# Patient Record
Sex: FEMALE | Race: WHITE | NOT HISPANIC OR LATINO
[De-identification: names, ages, dates, MRNs, and addresses within clinical notes are randomized per-mention and may not be internally consistent; named-entity substitution may affect disease eponyms.]

---

## 2018-11-09 ENCOUNTER — RESULT REVIEW (OUTPATIENT)
Age: 28
End: 2018-11-09

## 2019-11-15 ENCOUNTER — RESULT REVIEW (OUTPATIENT)
Age: 29
End: 2019-11-15

## 2020-05-22 ENCOUNTER — TRANSCRIPTION ENCOUNTER (OUTPATIENT)
Age: 30
End: 2020-05-22

## 2020-10-01 ENCOUNTER — APPOINTMENT (OUTPATIENT)
Dept: DERMATOLOGY | Facility: CLINIC | Age: 30
End: 2020-10-01
Payer: COMMERCIAL

## 2020-10-01 PROCEDURE — 99203 OFFICE O/P NEW LOW 30 MIN: CPT

## 2020-10-15 ENCOUNTER — TRANSCRIPTION ENCOUNTER (OUTPATIENT)
Age: 30
End: 2020-10-15

## 2021-02-10 ENCOUNTER — APPOINTMENT (OUTPATIENT)
Dept: DERMATOLOGY | Facility: CLINIC | Age: 31
End: 2021-02-10
Payer: COMMERCIAL

## 2021-02-10 PROCEDURE — 99072 ADDL SUPL MATRL&STAF TM PHE: CPT

## 2021-02-10 PROCEDURE — 99213 OFFICE O/P EST LOW 20 MIN: CPT

## 2021-04-15 ENCOUNTER — RESULT REVIEW (OUTPATIENT)
Age: 31
End: 2021-04-15

## 2021-11-27 ENCOUNTER — TRANSCRIPTION ENCOUNTER (OUTPATIENT)
Age: 31
End: 2021-11-27

## 2021-12-30 ENCOUNTER — APPOINTMENT (OUTPATIENT)
Dept: DERMATOLOGY | Facility: CLINIC | Age: 31
End: 2021-12-30
Payer: COMMERCIAL

## 2021-12-30 PROCEDURE — 99214 OFFICE O/P EST MOD 30 MIN: CPT

## 2022-05-24 ENCOUNTER — RESULT REVIEW (OUTPATIENT)
Age: 32
End: 2022-05-24

## 2022-08-08 ENCOUNTER — EMERGENCY (EMERGENCY)
Facility: HOSPITAL | Age: 32
LOS: 1 days | Discharge: HOME | End: 2022-08-08
Attending: STUDENT IN AN ORGANIZED HEALTH CARE EDUCATION/TRAINING PROGRAM
Payer: COMMERCIAL

## 2022-08-08 VITALS
SYSTOLIC BLOOD PRESSURE: 130 MMHG | HEIGHT: 62 IN | HEART RATE: 100 BPM | RESPIRATION RATE: 18 BRPM | OXYGEN SATURATION: 98 % | DIASTOLIC BLOOD PRESSURE: 72 MMHG | WEIGHT: 156.75 LBS | TEMPERATURE: 99 F

## 2022-08-08 LAB
ALBUMIN SERPL ELPH-MCNC: 4.5 G/DL — SIGNIFICANT CHANGE UP (ref 3.3–5.2)
ALP SERPL-CCNC: 48 U/L — SIGNIFICANT CHANGE UP (ref 40–120)
ALT FLD-CCNC: 18 U/L — SIGNIFICANT CHANGE UP
ANION GAP SERPL CALC-SCNC: 14 MMOL/L — SIGNIFICANT CHANGE UP (ref 5–17)
AST SERPL-CCNC: 21 U/L — SIGNIFICANT CHANGE UP
BASOPHILS # BLD AUTO: 0.05 K/UL — SIGNIFICANT CHANGE UP (ref 0–0.2)
BASOPHILS NFR BLD AUTO: 0.5 % — SIGNIFICANT CHANGE UP (ref 0–2)
BILIRUB SERPL-MCNC: 0.5 MG/DL — SIGNIFICANT CHANGE UP (ref 0.4–2)
BLD GP AB SCN SERPL QL: SIGNIFICANT CHANGE UP
BUN SERPL-MCNC: 14.3 MG/DL — SIGNIFICANT CHANGE UP (ref 8–20)
CALCIUM SERPL-MCNC: 9.2 MG/DL — SIGNIFICANT CHANGE UP (ref 8.4–10.5)
CHLORIDE SERPL-SCNC: 98 MMOL/L — SIGNIFICANT CHANGE UP (ref 98–107)
CO2 SERPL-SCNC: 24 MMOL/L — SIGNIFICANT CHANGE UP (ref 22–29)
CREAT SERPL-MCNC: 0.5 MG/DL — SIGNIFICANT CHANGE UP (ref 0.5–1.3)
EGFR: 127 ML/MIN/1.73M2 — SIGNIFICANT CHANGE UP
EOSINOPHIL # BLD AUTO: 0.04 K/UL — SIGNIFICANT CHANGE UP (ref 0–0.5)
EOSINOPHIL NFR BLD AUTO: 0.4 % — SIGNIFICANT CHANGE UP (ref 0–6)
GLUCOSE SERPL-MCNC: 139 MG/DL — HIGH (ref 70–99)
HCG SERPL-ACNC: <4 MIU/ML — SIGNIFICANT CHANGE UP
HCT VFR BLD CALC: 40.4 % — SIGNIFICANT CHANGE UP (ref 34.5–45)
HGB BLD-MCNC: 13.9 G/DL — SIGNIFICANT CHANGE UP (ref 11.5–15.5)
IMM GRANULOCYTES NFR BLD AUTO: 0.5 % — SIGNIFICANT CHANGE UP (ref 0–1.5)
LYMPHOCYTES # BLD AUTO: 1.94 K/UL — SIGNIFICANT CHANGE UP (ref 1–3.3)
LYMPHOCYTES # BLD AUTO: 17.6 % — SIGNIFICANT CHANGE UP (ref 13–44)
MCHC RBC-ENTMCNC: 30.4 PG — SIGNIFICANT CHANGE UP (ref 27–34)
MCHC RBC-ENTMCNC: 34.4 GM/DL — SIGNIFICANT CHANGE UP (ref 32–36)
MCV RBC AUTO: 88.4 FL — SIGNIFICANT CHANGE UP (ref 80–100)
MONOCYTES # BLD AUTO: 0.54 K/UL — SIGNIFICANT CHANGE UP (ref 0–0.9)
MONOCYTES NFR BLD AUTO: 4.9 % — SIGNIFICANT CHANGE UP (ref 2–14)
NEUTROPHILS # BLD AUTO: 8.43 K/UL — HIGH (ref 1.8–7.4)
NEUTROPHILS NFR BLD AUTO: 76.1 % — SIGNIFICANT CHANGE UP (ref 43–77)
PLATELET # BLD AUTO: 418 K/UL — HIGH (ref 150–400)
POTASSIUM SERPL-MCNC: 3.5 MMOL/L — SIGNIFICANT CHANGE UP (ref 3.5–5.3)
POTASSIUM SERPL-SCNC: 3.5 MMOL/L — SIGNIFICANT CHANGE UP (ref 3.5–5.3)
PROT SERPL-MCNC: 7.2 G/DL — SIGNIFICANT CHANGE UP (ref 6.6–8.7)
RBC # BLD: 4.57 M/UL — SIGNIFICANT CHANGE UP (ref 3.8–5.2)
RBC # FLD: 12.2 % — SIGNIFICANT CHANGE UP (ref 10.3–14.5)
SODIUM SERPL-SCNC: 136 MMOL/L — SIGNIFICANT CHANGE UP (ref 135–145)
WBC # BLD: 11.05 K/UL — HIGH (ref 3.8–10.5)
WBC # FLD AUTO: 11.05 K/UL — HIGH (ref 3.8–10.5)

## 2022-08-08 PROCEDURE — 99285 EMERGENCY DEPT VISIT HI MDM: CPT

## 2022-08-08 NOTE — ED STATDOCS - PATIENT PORTAL LINK FT
You can access the FollowMyHealth Patient Portal offered by [facility] by registering at the following website: [email or web address]. By joining [employer]’s FollowMyHealth portal, you will also be able to view your health information using other applications (apps) compatible with our system.

## 2022-08-08 NOTE — ED STATDOCS - NSFOLLOWUPINSTRUCTIONS_ED_ALL_ED_FT
[facility]                                                                                                                                                                                      Deep Vein Thrombosis       Deep vein thrombosis (DVT) is a condition in which a blood clot forms in a deep vein, such as a vein in the lower leg, thigh, pelvis, or arm. Deep veins are veins in the deep venous system. A clot is blood that has thickened into a gel or solid. This condition is serious and can be life-threatening if the clot travels to the lungs and causes a blockage (pulmonary embolism) in the arteries of the lung. A DVT can also damage veins in the leg. This can lead to long-term, or chronic, venous disease, leg pain, swelling, discoloration, and ulcers or sores (post-thrombotic syndrome).      What are the causes?    This condition may be caused by:  •A slowdown of blood flow.      •Damage to a vein.      •A condition that causes blood to clot more easily, such as certain blood-clotting disorders.        What increases the risk?    The following factors may make you more likely to develop this condition:  •Having obesity.      •Being older, especially older than age 60.    •Being inactive (sedentary lifestyle) or not moving around. This may include:  •Sitting or lying down for longer than 4–6 hours other than to sleep at night.      •Being in the hospital, having major or lengthy surgery, or having a thin, flexible tube (central line catheter) placed in a large vein.        •Being pregnant, giving birth, or having recently given birth.      •Taking medicines that contain estrogen, such as birth control or hormone replacement therapy.      •Using products that contain nicotine or tobacco, especially if you use hormonal birth control.      •Having a history of blood clots or a blood-clotting disease, a blood vessel disease (peripheral vascular disease), or congestive heart disease.      •Having a history of cancer, especially if being treated with chemotherapy.        What are the signs or symptoms?    Symptoms of this condition include:  •Swelling, pain, pressure, or tenderness in an arm or a leg.      •An arm or a leg becoming warm, red, or discolored.      •A leg turning very pale. You may have a large DVT. This is rare.      If the clot is in your leg, you may notice symptoms more or have worse symptoms when you stand or walk.    In some cases, there are no symptoms.      How is this diagnosed?    This condition is diagnosed with:  •Your medical history and a physical exam.    •Tests, such as:  •Blood tests to check how well your blood clots.      •Doppler ultrasound. This is the best way to find a DVT.      •Venogram. Contrast dye is injected into a vein, and X-rays are taken to check for clots.          How is this treated?    Treatment for this condition depends on:  •The cause of your DVT.      •The size and location of your DVT, or having more than one DVT.      •Your risk for bleeding or developing more clots.      •Other medical conditions you may have.      Treatment may include:  •Taking a blood thinner, also called an anticoagulant, to prevent clots from forming and growing.      •Wearing compression stockings, if directed.      •Injecting medicines into the affected vein to break up the clot (catheter-directed thrombolysis). This is used only for severe DVT and only if a specialist recommends it.    •Specific surgical procedures, when DVT is severe or hard to treat. These may be done to:  •Isolate and remove your clot.      •Place an inferior vena cava (IVC) filter in a large vein to catch blood clots before they reach your lungs.        You may get some medical treatments for 6 months or longer.      Follow these instructions at home:    If you are taking blood thinners:     •Talk with your health care provider before you take any medicines that contain aspirin or NSAIDs, such as ibuprofen. These medicines increase your risk for dangerous bleeding.      •Take your medicine exactly as told, at the same time every day. Do not skip a dose. Do not take more than the prescribed dose. This is important.      •Ask your health care provider about foods and medicines that could change the way your blood thinner works (may interact). Avoid these foods and medicines if you are told to do so.    •Avoid anything that may cause bleeding or bruising. You may bleed more easily while taking blood thinners.  •Be very careful when using knives, scissors, or other sharp objects.      •Use an electric razor instead of a blade.      •Avoid activities that could cause injury or bruising, and follow instructions for preventing falls.      •Tell your health care provider if you have had any internal bleeding, bleeding ulcers, or neurologic diseases, such as strokes or cerebral aneurysms.        •Wear a medical alert bracelet or carry a card that lists what medicines you take.      General instructions     •Take over-the-counter and prescription medicines only as told by your health care provider.      •Return to your normal activities as told by your health care provider. Ask your health care provider what activities are safe for you.      •If recommended, wear compression stockings as told by your health care provider. These stockings help to prevent blood clots and reduce swelling in your legs.      •Keep all follow-up visits as told by your health care provider. This is important.        Contact a health care provider if:    •You miss a dose of your blood thinner.      •You have new or worse pain, swelling, or redness in an arm or a leg.      •You have worsening numbness or tingling in an arm or a leg.      •You have unusual bruising.        Get help right away if:  •You have signs or symptoms that a blood clot has moved to the lungs. These may include:  •Shortness of breath.      •Chest pain.      •Fast or irregular heartbeats (palpitations).      •Light-headedness or dizziness.      •Coughing up blood.      •You have signs or symptoms that your blood is too thin. These may include:  •Blood in your vomit, stool, or urine.      •A cut that will not stop bleeding.      •A menstrual period that is heavier than usual.      •A severe headache or confusion.        These symptoms may represent a serious problem that is an emergency. Do not wait to see if the symptoms will go away. Get medical help right away. Call your local emergency services (911 in the [place] Do not drive yourself to the hospital.       Summary    •Deep vein thrombosis (DVT) happens when a blood clot forms in a deep vein. This may occur in the lower leg, thigh, pelvis, or arm.      •Symptoms affect the arm or leg and can include swelling, pain, tenderness, warmth, redness, or discoloration.      •This condition may be treated with medicines or compression stockings. In severe cases, surgery may be done.      •If you are taking blood thinners, take them exactly as told. Do not skip a dose. Do not take more than is prescribed.      •Get help right away if you have shortness of breath, chest pain, fast or irregular heartbeats, or blood in your vomit, urine, or stool.      This information is not intended to replace advice given to you by your health care provider. Make sure you discuss any questions you have with your health care provider.      Document Revised: 12/11/2020 Document Reviewed: 12/12/2020    Elsevier Patient Education © 2022 [employer]

## 2022-08-08 NOTE — ED ADULT NURSE NOTE - OBJECTIVE STATEMENT
Patient is A&Ox3, resp wnl, c/o swelling to right arm, had covid 7/24. Patient states she just came from having an US + Blood clot to right arm x 2

## 2022-08-08 NOTE — ED STATDOCS - OBJECTIVE STATEMENT
30 y/o female with no significant PMHx presents with one day of right arm swelling noticed yesterday when going for walk with . denies trauma. went to urgent care who ordered US of right upper extremity today. found to have a DVT in right upper subclavian and axillary. Had COVID a few weeks ago, fully recovered. Not taking any oral contraception. Denies any clotting disorders in the family. Denies neck swelling, HA, CP, SOB, abdominal pain, leg swelling.

## 2022-08-08 NOTE — ED STATDOCS - ATTENDING APP SHARED VISIT CONTRIBUTION OF CARE
I, Patricia Cheng, performed a face to face bedside interview with this patient regarding history of present illness, review of symptoms and relevant past medical, social and family history.  I completed an independent physical examination. Medical decision making, follow-up on ordered tests (ie labs, radiologic studies) and re-evaluation of the patient's status has been communicated to the ACP.  Disposition of the patient will be based on test outcome and response to ED interventions.

## 2022-08-08 NOTE — ED STATDOCS - CLINICAL SUMMARY MEDICAL DECISION MAKING FREE TEXT BOX
found to DVT in right subclavian and axially on out patient US with report. will get CT to evaluate for clot burden, check labs, and re-assess.

## 2022-08-08 NOTE — ED STATDOCS - NSFOLLOWUPCLINICS_GEN_ALL_ED_FT
[facility]ascular Surgery  Vascular Surgery  [address] [place] [address]  Phone: [phone]  Fax:

## 2022-08-08 NOTE — ED ADULT NURSE NOTE - CHIEF COMPLAINT QUOTE
pt states she just came from having an US + Blood clot to right arm x 2  A&Ox3, resp wnl, c/o swelling to right arm, had covid 7/24

## 2022-08-08 NOTE — ED STATDOCS - PHYSICAL EXAMINATION
Vital Signs per nursing documentation  Gen: well appearing, no acute distress  HEENT: NCAT, MMM  Cardiac: regular rate rhythm, normal S1S2  Chest: clear to auscultation bilateral, no wheezes or crackles  Abdomen: soft, non tender non distended  Extremity: Minimal right arm swelling. no gross deformity, good perfusion  Skin: no rash  Neuro: nonfocal neuro exam, gait steady

## 2022-08-08 NOTE — ED STATDOCS - PROGRESS NOTE DETAILS
reviewed ct rsults, ultrasound with dvt, vascular consulted, waiting for vascular to get back with recommendations, pt stating that she can no longer wait in the ed, explained to patient risks of leaving, vascular still stating has not been able to reach attending  will start on xarelto, contacted mark guerrero to reach out to patient for appt with vascular outpatient

## 2022-08-09 ENCOUNTER — INPATIENT (INPATIENT)
Facility: HOSPITAL | Age: 32
LOS: 0 days | Discharge: HOME | DRG: 272 | End: 2022-08-10
Attending: SURGERY | Admitting: SURGERY
Payer: COMMERCIAL

## 2022-08-09 VITALS
RESPIRATION RATE: 18 BRPM | OXYGEN SATURATION: 98 % | SYSTOLIC BLOOD PRESSURE: 128 MMHG | DIASTOLIC BLOOD PRESSURE: 81 MMHG | TEMPERATURE: 98 F | WEIGHT: 145.06 LBS | HEIGHT: 62 IN | HEART RATE: 87 BPM

## 2022-08-09 VITALS
RESPIRATION RATE: 18 BRPM | TEMPERATURE: 98 F | OXYGEN SATURATION: 100 % | HEART RATE: 84 BPM | DIASTOLIC BLOOD PRESSURE: 90 MMHG | SYSTOLIC BLOOD PRESSURE: 120 MMHG

## 2022-08-09 DIAGNOSIS — I82.409 ACUTE EMBOLISM AND THROMBOSIS OF UNSPECIFIED DEEP VEINS OF UNSPECIFIED LOWER EXTREMITY: ICD-10-CM

## 2022-08-09 LAB
ALBUMIN SERPL ELPH-MCNC: 4.5 G/DL — SIGNIFICANT CHANGE UP (ref 3.3–5.2)
ALP SERPL-CCNC: 44 U/L — SIGNIFICANT CHANGE UP (ref 40–120)
ALT FLD-CCNC: 22 U/L — SIGNIFICANT CHANGE UP
ANION GAP SERPL CALC-SCNC: 13 MMOL/L — SIGNIFICANT CHANGE UP (ref 5–17)
APTT BLD: 36.2 SEC — HIGH (ref 27.5–35.5)
APTT BLD: 58.4 SEC — HIGH (ref 27.5–35.5)
AST SERPL-CCNC: 37 U/L — HIGH
BASOPHILS # BLD AUTO: 0.05 K/UL — SIGNIFICANT CHANGE UP (ref 0–0.2)
BASOPHILS NFR BLD AUTO: 0.4 % — SIGNIFICANT CHANGE UP (ref 0–2)
BILIRUB SERPL-MCNC: 0.8 MG/DL — SIGNIFICANT CHANGE UP (ref 0.4–2)
BUN SERPL-MCNC: 10 MG/DL — SIGNIFICANT CHANGE UP (ref 8–20)
CALCIUM SERPL-MCNC: 9.3 MG/DL — SIGNIFICANT CHANGE UP (ref 8.4–10.5)
CHLORIDE SERPL-SCNC: 101 MMOL/L — SIGNIFICANT CHANGE UP (ref 98–107)
CO2 SERPL-SCNC: 23 MMOL/L — SIGNIFICANT CHANGE UP (ref 22–29)
CREAT SERPL-MCNC: 0.42 MG/DL — LOW (ref 0.5–1.3)
EGFR: 132 ML/MIN/1.73M2 — SIGNIFICANT CHANGE UP
EOSINOPHIL # BLD AUTO: 0.05 K/UL — SIGNIFICANT CHANGE UP (ref 0–0.5)
EOSINOPHIL NFR BLD AUTO: 0.4 % — SIGNIFICANT CHANGE UP (ref 0–6)
GLUCOSE SERPL-MCNC: 98 MG/DL — SIGNIFICANT CHANGE UP (ref 70–99)
HCT VFR BLD CALC: 41.3 % — SIGNIFICANT CHANGE UP (ref 34.5–45)
HCT VFR BLD CALC: 41.7 % — SIGNIFICANT CHANGE UP (ref 34.5–45)
HGB BLD-MCNC: 13.8 G/DL — SIGNIFICANT CHANGE UP (ref 11.5–15.5)
HGB BLD-MCNC: 14.1 G/DL — SIGNIFICANT CHANGE UP (ref 11.5–15.5)
IMM GRANULOCYTES NFR BLD AUTO: 0.3 % — SIGNIFICANT CHANGE UP (ref 0–1.5)
INR BLD: 1.55 RATIO — HIGH (ref 0.88–1.16)
LYMPHOCYTES # BLD AUTO: 15.4 % — SIGNIFICANT CHANGE UP (ref 13–44)
LYMPHOCYTES # BLD AUTO: 2.19 K/UL — SIGNIFICANT CHANGE UP (ref 1–3.3)
MCHC RBC-ENTMCNC: 29.7 PG — SIGNIFICANT CHANGE UP (ref 27–34)
MCHC RBC-ENTMCNC: 30.1 PG — SIGNIFICANT CHANGE UP (ref 27–34)
MCHC RBC-ENTMCNC: 33.4 GM/DL — SIGNIFICANT CHANGE UP (ref 32–36)
MCHC RBC-ENTMCNC: 33.8 GM/DL — SIGNIFICANT CHANGE UP (ref 32–36)
MCV RBC AUTO: 88.9 FL — SIGNIFICANT CHANGE UP (ref 80–100)
MCV RBC AUTO: 89 FL — SIGNIFICANT CHANGE UP (ref 80–100)
MONOCYTES # BLD AUTO: 0.97 K/UL — HIGH (ref 0–0.9)
MONOCYTES NFR BLD AUTO: 6.8 % — SIGNIFICANT CHANGE UP (ref 2–14)
NEUTROPHILS # BLD AUTO: 10.95 K/UL — HIGH (ref 1.8–7.4)
NEUTROPHILS NFR BLD AUTO: 76.7 % — SIGNIFICANT CHANGE UP (ref 43–77)
PLATELET # BLD AUTO: 385 K/UL — SIGNIFICANT CHANGE UP (ref 150–400)
PLATELET # BLD AUTO: 420 K/UL — HIGH (ref 150–400)
POTASSIUM SERPL-MCNC: 5 MMOL/L — SIGNIFICANT CHANGE UP (ref 3.5–5.3)
POTASSIUM SERPL-SCNC: 5 MMOL/L — SIGNIFICANT CHANGE UP (ref 3.5–5.3)
PROT SERPL-MCNC: 7.7 G/DL — SIGNIFICANT CHANGE UP (ref 6.6–8.7)
PROTHROM AB SERPL-ACNC: 18.1 SEC — HIGH (ref 10.5–13.4)
RAPID RVP RESULT: SIGNIFICANT CHANGE UP
RBC # BLD: 4.64 M/UL — SIGNIFICANT CHANGE UP (ref 3.8–5.2)
RBC # BLD: 4.69 M/UL — SIGNIFICANT CHANGE UP (ref 3.8–5.2)
RBC # FLD: 12.1 % — SIGNIFICANT CHANGE UP (ref 10.3–14.5)
RBC # FLD: 12.2 % — SIGNIFICANT CHANGE UP (ref 10.3–14.5)
SARS-COV-2 RNA SPEC QL NAA+PROBE: SIGNIFICANT CHANGE UP
SODIUM SERPL-SCNC: 137 MMOL/L — SIGNIFICANT CHANGE UP (ref 135–145)
WBC # BLD: 10.81 K/UL — HIGH (ref 3.8–10.5)
WBC # BLD: 14.25 K/UL — HIGH (ref 3.8–10.5)
WBC # FLD AUTO: 10.81 K/UL — HIGH (ref 3.8–10.5)
WBC # FLD AUTO: 14.25 K/UL — HIGH (ref 3.8–10.5)

## 2022-08-09 PROCEDURE — 84702 CHORIONIC GONADOTROPIN TEST: CPT

## 2022-08-09 PROCEDURE — 86901 BLOOD TYPING SEROLOGIC RH(D): CPT

## 2022-08-09 PROCEDURE — 99284 EMERGENCY DEPT VISIT MOD MDM: CPT | Mod: 25

## 2022-08-09 PROCEDURE — 86900 BLOOD TYPING SEROLOGIC ABO: CPT

## 2022-08-09 PROCEDURE — 80053 COMPREHEN METABOLIC PANEL: CPT

## 2022-08-09 PROCEDURE — 86850 RBC ANTIBODY SCREEN: CPT

## 2022-08-09 PROCEDURE — 71275 CT ANGIOGRAPHY CHEST: CPT | Mod: MD

## 2022-08-09 PROCEDURE — 99285 EMERGENCY DEPT VISIT HI MDM: CPT

## 2022-08-09 PROCEDURE — 71275 CT ANGIOGRAPHY CHEST: CPT | Mod: 26,MD

## 2022-08-09 PROCEDURE — 85025 COMPLETE CBC W/AUTO DIFF WBC: CPT

## 2022-08-09 PROCEDURE — 71046 X-RAY EXAM CHEST 2 VIEWS: CPT | Mod: 26

## 2022-08-09 PROCEDURE — 36415 COLL VENOUS BLD VENIPUNCTURE: CPT

## 2022-08-09 PROCEDURE — 93971 EXTREMITY STUDY: CPT | Mod: 26,RT

## 2022-08-09 RX ORDER — HEPARIN SODIUM 5000 [USP'U]/ML
INJECTION INTRAVENOUS; SUBCUTANEOUS
Qty: 25000 | Refills: 0 | Status: DISCONTINUED | OUTPATIENT
Start: 2022-08-09 | End: 2022-08-10

## 2022-08-09 RX ORDER — HEPARIN SODIUM 5000 [USP'U]/ML
2500 INJECTION INTRAVENOUS; SUBCUTANEOUS EVERY 6 HOURS
Refills: 0 | Status: DISCONTINUED | OUTPATIENT
Start: 2022-08-09 | End: 2022-08-10

## 2022-08-09 RX ORDER — HEPARIN SODIUM 5000 [USP'U]/ML
5500 INJECTION INTRAVENOUS; SUBCUTANEOUS EVERY 6 HOURS
Refills: 0 | Status: DISCONTINUED | OUTPATIENT
Start: 2022-08-09 | End: 2022-08-10

## 2022-08-09 RX ORDER — ONDANSETRON 8 MG/1
4 TABLET, FILM COATED ORAL EVERY 6 HOURS
Refills: 0 | Status: DISCONTINUED | OUTPATIENT
Start: 2022-08-09 | End: 2022-08-10

## 2022-08-09 RX ORDER — FONDAPARINUX SODIUM 2.5 MG/.5ML
1 INJECTION, SOLUTION SUBCUTANEOUS
Qty: 42 | Refills: 0
Start: 2022-08-09 | End: 2022-08-29

## 2022-08-09 RX ORDER — HEPARIN SODIUM 5000 [USP'U]/ML
5500 INJECTION INTRAVENOUS; SUBCUTANEOUS ONCE
Refills: 0 | Status: COMPLETED | OUTPATIENT
Start: 2022-08-09 | End: 2022-08-09

## 2022-08-09 RX ORDER — ACETAMINOPHEN 500 MG
650 TABLET ORAL EVERY 6 HOURS
Refills: 0 | Status: DISCONTINUED | OUTPATIENT
Start: 2022-08-09 | End: 2022-08-10

## 2022-08-09 RX ORDER — RIVAROXABAN 15 MG-20MG
15 KIT ORAL ONCE
Refills: 0 | Status: COMPLETED | OUTPATIENT
Start: 2022-08-09 | End: 2022-08-09

## 2022-08-09 RX ORDER — SODIUM CHLORIDE 9 MG/ML
1000 INJECTION INTRAMUSCULAR; INTRAVENOUS; SUBCUTANEOUS
Refills: 0 | Status: DISCONTINUED | OUTPATIENT
Start: 2022-08-09 | End: 2022-08-10

## 2022-08-09 RX ADMIN — HEPARIN SODIUM 1200 UNIT(S)/HR: 5000 INJECTION INTRAVENOUS; SUBCUTANEOUS at 18:11

## 2022-08-09 RX ADMIN — RIVAROXABAN 15 MILLIGRAM(S): KIT at 05:04

## 2022-08-09 RX ADMIN — HEPARIN SODIUM 1200 UNIT(S)/HR: 5000 INJECTION INTRAVENOUS; SUBCUTANEOUS at 09:42

## 2022-08-09 NOTE — CONSULT NOTE ADULT - SUBJECTIVE AND OBJECTIVE BOX
Patient is a 31yoF with no PMH who presents to the ED with 2 days of RUQ swelling. She mentions she was walking yesterday morning when her arm felt swollen, without pain, numbness/tingling or motor deficits. She presented to an Urgent care center where she underwent RUQ Duplex which showed R subclavian and R axillary vein thrombosis. She mentions she recently recovered from a severe bout of COVID19 infection (tested positive 2 weeks ago). She denies any headache, flushing, chest pain, SOB, fevers, chills, nausea, vomiting, abdominal pain, constipation, diarrhea.     PMH: denies  PSH: R arm surgery (broken arm as a child)  Meds: denies  Allergies: NKDA  SH: denies illicit drug use x3    Vitals:  Vital Signs Last 24 Hrs  T(C): 36.7 (09 Aug 2022 00:09), Max: 37 (08 Aug 2022 18:27)  T(F): 98.1 (09 Aug 2022 00:09), Max: 98.6 (08 Aug 2022 18:27)  HR: 84 (09 Aug 2022 00:09) (84 - 100)  BP: 120/90 (09 Aug 2022 00:09) (120/90 - 130/72)  BP(mean): --  RR: 18 (09 Aug 2022 00:09) (18 - 18)  SpO2: 100% (09 Aug 2022 00:09) (98% - 100%)    Parameters below as of 09 Aug 2022 00:09  Patient On (Oxygen Delivery Method): room air    Labs:  08-08    136  |  98  |  14.3  ----------------------------<  139<H>  3.5   |  24.0  |  0.50    Ca    9.2      08 Aug 2022 19:30    TPro  7.2  /  Alb  4.5  /  TBili  0.5  /  DBili  x   /  AST  21  /  ALT  18  /  AlkPhos  48  08-08                            13.9   11.05 )-----------( 418      ( 08 Aug 2022 19:30 )             40.4       Physical Exam:  Gen: pt lying in bed, alert, in NAD  Resp: unlabored  CVS: RRR  Abd: soft, NT, ND  Ext: moving all extremities spontaneously, sensation intact, pulses 2+  Patient is a 31yoF with no PMH who presents to the ED with 2 days of RUQ swelling. She mentions she was walking yesterday morning when her arm felt swollen, without pain, numbness/tingling or motor deficits. She presented to an Urgent care center where she underwent RUQ Duplex which showed R subclavian and R axillary vein thrombosis. She mentions she recently recovered from a severe bout of COVID19 infection (tested positive 2 weeks ago). She denies any headache, flushing, chest pain, SOB, fevers, chills, nausea, vomiting, abdominal pain, constipation, diarrhea.     PMH: denies  PSH: R arm surgery (broken arm as a child)  Meds: denies  Allergies: NKDA  SH: denies illicit drug use x3  FH: no hyper-/hypocoagulable state    Vitals:  Vital Signs Last 24 Hrs  T(C): 36.7 (09 Aug 2022 00:09), Max: 37 (08 Aug 2022 18:27)  T(F): 98.1 (09 Aug 2022 00:09), Max: 98.6 (08 Aug 2022 18:27)  HR: 84 (09 Aug 2022 00:09) (84 - 100)  BP: 120/90 (09 Aug 2022 00:09) (120/90 - 130/72)  BP(mean): --  RR: 18 (09 Aug 2022 00:09) (18 - 18)  SpO2: 100% (09 Aug 2022 00:09) (98% - 100%)    Parameters below as of 09 Aug 2022 00:09  Patient On (Oxygen Delivery Method): room air    Labs:  08-08    136  |  98  |  14.3  ----------------------------<  139<H>  3.5   |  24.0  |  0.50    Ca    9.2      08 Aug 2022 19:30    TPro  7.2  /  Alb  4.5  /  TBili  0.5  /  DBili  x   /  AST  21  /  ALT  18  /  AlkPhos  48  08-08                            13.9   11.05 )-----------( 418      ( 08 Aug 2022 19:30 )             40.4       Physical Exam:  Gen: pt lying in bed, alert, in NAD  Resp: unlabored  CVS: RRR  Abd: soft, NT, ND  Ext: moving all extremities spontaneously, sensation intact, pulses 2+  Patient is a 31yoF with no PMH who presents to the ED with 2 days of RUQ swelling. She mentions she was walking yesterday morning when her arm felt swollen, without pain, numbness/tingling or motor deficits. She presented to an Urgent care center where she underwent RUQ Duplex which showed R subclavian and R axillary vein thrombosis. She mentions she recently recovered from a severe bout of COVID19 infection (tested positive 2 weeks ago). She denies any headache, flushing, chest pain, SOB, fevers, chills, nausea, vomiting, abdominal pain, constipation, diarrhea.     PMH: denies  PSH: R arm surgery (broken arm as a child)  Meds: denies  Allergies: NKDA  SH: denies illicit drug use x3  FH: no hyper-/hypocoagulable disease    Vitals:  Vital Signs Last 24 Hrs  T(C): 36.7 (09 Aug 2022 00:09), Max: 37 (08 Aug 2022 18:27)  T(F): 98.1 (09 Aug 2022 00:09), Max: 98.6 (08 Aug 2022 18:27)  HR: 84 (09 Aug 2022 00:09) (84 - 100)  BP: 120/90 (09 Aug 2022 00:09) (120/90 - 130/72)  BP(mean): --  RR: 18 (09 Aug 2022 00:09) (18 - 18)  SpO2: 100% (09 Aug 2022 00:09) (98% - 100%)    Parameters below as of 09 Aug 2022 00:09  Patient On (Oxygen Delivery Method): room air    Labs:  08-08    136  |  98  |  14.3  ----------------------------<  139<H>  3.5   |  24.0  |  0.50    Ca    9.2      08 Aug 2022 19:30    TPro  7.2  /  Alb  4.5  /  TBili  0.5  /  DBili  x   /  AST  21  /  ALT  18  /  AlkPhos  48  08-08                            13.9   11.05 )-----------( 418      ( 08 Aug 2022 19:30 )             40.4       Physical Exam:  Gen: pt lying in bed, alert, in NAD  Resp: unlabored  CVS: RRR  Abd: soft, NT, ND  Ext: moving all extremities spontaneously, sensation intact, pulses 2+

## 2022-08-09 NOTE — H&P ADULT - NSHPREVIEWOFSYSTEMS_GEN_ALL_CORE
Patient appears comfortable. No acute distress  HEENT:  Atraumatic. Normocephalic.  Normal oral mucosa, PERRL, Neck is supple  Neurologic: A & O x 3, no focal deficits. EOMI.  Cardiovascular: normal rate and rhythm  Respiratory: respirations are even and non labored   Gastrointestinal:  Soft, Non-tender, non distended  Extremities: No edema, BRAVO, warm and well perfused  Pulses: palpable radial pulses, palpable femoral pulses, palpable pedal pulses

## 2022-08-09 NOTE — ED POST DISCHARGE NOTE - DETAILS
called patient left voicemail on phone, pt should be instructed to return to the ed pt will return to the ed

## 2022-08-09 NOTE — PATIENT PROFILE ADULT - BILL PAYMENT
Pt has been sleeping in sensory room B, Pleasant and cooperative ate breakfast and went back to sleep. Pt is waiting to be reassess today to determine if they meet the criteria to go back home after CO SI attempt. Pt was med comliant and took tylenol for headache.   no

## 2022-08-09 NOTE — PATIENT PROFILE ADULT - FALL HARM RISK - FACTORS NURSING JUDGEMENT
ams earlier, resolved now, may have missed 1 or more doses of Keppra,  BEFAST NEG, aLSO C/O h/a, RESOLVED No

## 2022-08-09 NOTE — ED POST DISCHARGE NOTE - RESULT SUMMARY
vascular attending got back to ER pa at 5:20 RECOMMENDING repeat ultrasound duplex of upper extremeity, starting patient on heparin drip, pending dispo admission pending results of repeat Ultrasound enedelia

## 2022-08-09 NOTE — ED ADULT NURSE NOTE - BREATHING, MLM
Vomiting, abdominal distension, epigastric pain. Onset Friday. But has had years of issues with episodes of vomiting.   
Spontaneous, unlabored and symmetrical

## 2022-08-09 NOTE — H&P ADULT - ASSESSMENT
31 year old female, no significant PMHx, presents to the ED with c/o left arm swelling on Sunday. Pt reports that she was diagnosed with COVID two weeks prior. Patient was seen at urgent care and had an US completed showing a thrombus in the left subclavian vein. Patient sent to the ED from urgent care. Patient had venous duplex showing thrombus of the prox, mid and distal subclavian vein. Patient states that swelling has improved since Sunday. She denies any numbness/tingling/discoloration/excessive exercise, CP, SOB, AP, fevers or chills.       Plan:  Left Subclavian Thrombus   -Continue heparin drip  -CXR completed: no cervical rib noted   -Plan for INARI thrombectomy tomorrow (8/10)  -NPO after midnight        31 year old female, no significant PMHx, presents to the ED with c/o left arm swelling on Sunday. Pt reports that she was diagnosed with COVID two weeks prior. Patient was seen at urgent care and had an US completed showing a thrombus in the left subclavian vein. Patient sent to the ED from urgent care. Patient had venous duplex showing thrombus of the prox, mid and distal subclavian vein. Patient states that swelling has improved since Sunday. She denies any numbness/tingling/discoloration/excessive exercise, CP, SOB, AP, fevers or chills.       Plan:  Left Subclavian Thrombus   -Continue heparin drip  -CXR completed: no cervical rib noted   -Plan for INARI thrombectomy tomorrow (8/10) r/o TOS  -NPO after midnight

## 2022-08-09 NOTE — PATIENT PROFILE ADULT - FALL HARM RISK - UNIVERSAL INTERVENTIONS
Bed in lowest position, wheels locked, appropriate side rails in place/Call bell, personal items and telephone in reach/Instruct patient to call for assistance before getting out of bed or chair/Non-slip footwear when patient is out of bed/[place] to call system/Physically safe environment - no spills, clutter or unnecessary equipment/Purposeful Proactive Rounding/Room/bathroom lighting operational, light cord in reach

## 2022-08-09 NOTE — ED PROVIDER NOTE - PHYSICAL EXAMINATION
Const: Awake, alert and oriented. In no acute distress. Well appearing.  HEENT: NC/AT. Moist mucous membranes.  Eyes: No scleral icterus. EOMI.  Neck:. Soft and supple. Full ROM without pain.  Cardiac: . +S1/S2. No murmurs. Peripheral pulses 2+ and symmetric. No LE edema.  Resp: Speaking in full sentences. No evidence of respiratory distress. No wheezes, rales or rhonchi.  Abd: Soft, non-tender, non-distended. Normal bowel sounds in all 4 quadrants. No guarding or rebound.  MSK: FROM in all extremities neurovasculary intact radial pulse 2_ hand  5/5   Back: Spine midline and non-tender. No CVAT.  Skin: minimal right upper arm swelling noted no signs of infection  Lymph: No cervical lymphadenopathy.  Neuro: Awake, alert & oriented x 3. Moves all extremities symmetrically.

## 2022-08-09 NOTE — PATIENT PROFILE ADULT - FALL HARM RISK - FALL HARM RISK
Patient stated that she is not feeling well. States that she is sweaty and wet. Requesting a gown to put on. No indicators present

## 2022-08-09 NOTE — ED ADULT NURSE REASSESSMENT NOTE - NS ED NURSE REASSESS COMMENT FT1
assumed care of pt from night RN; per RN report pt returns to ED upon vascular team request ; labs sent, US upper extremity done. pending pt PTT results prior to heparin infusion.   pt on tele/o2 monitors, a&ox3 offers no complaints, will continue to monitor

## 2022-08-09 NOTE — ED PROVIDER NOTE - OBJECTIVE STATEMENT
pt is a 30 y/o female presenting to the ed for evaluation pt was seen at [facility] earlier tonight, wanted to go home even though vascular team did not gt back with recommendations. after leaving vascular recommended heparin drip and repeat ultrasound upper extremity, called patient to come back. pt found to have clot in subclavian outpatient, ct angio preformed at [facility] negative   pt with swelling in the right arm x one day  pt denies cp sob fever nausea vomiting abd pain back pain numbness or loss of sensation abrasions or lacerations

## 2022-08-09 NOTE — H&P ADULT - NSHPLABSRESULTS_GEN_ALL_CORE
< from: CT Angio Chest PE Protocol w/ IV Cont (08.09.22 @ 00:58) >    IMPRESSION:    No pulmonary embolism identified.    --- End of Report ---      MARYANN CHAUHAN MD; Attending Radiologist  This document has been electronically signed. Aug  9 2022  1:38AM    < end of copied text >    < from: US Duplex Venous Upper Ext Ltd, Right (08.09.22 @ 07:13) >    IMPRESSION:  Occlusive thrombus throughout the right subclavian vein.    --- End of Report ---      EVANGELINA MATHIS MD; Attending Radiologist  This document has been electronically signed. Aug  9 2022  8:25AM    < end of copied text >

## 2022-08-09 NOTE — H&P ADULT - HISTORY OF PRESENT ILLNESS
31 year old female, no significant PMHx, presents to the ED with c/o left arm swelling on Sunday. Pt reports that she was diagnosed with COVID two weeks prior. Patient was seen at urgent care and had an US completed showing a thrombus in the left subclavian vein. Patient sent to the ED from urgent care. Patient had venous duplex showing thrombus of the prox, mid and distal subclavian vein. Patient states that swelling has improved since Sunday. She denies any numbness/tingling/discoloration/excessive exercise, CP, SOB, AP, fevers or chills.

## 2022-08-09 NOTE — ED PROVIDER NOTE - ATTENDING APP SHARED VISIT CONTRIBUTION OF CARE
Pt with R sublcavian DVT. Pt had initial workup last night and went home pending vascular consult but was called back for admission. no cp. no sob.    physical - rrr. ctab. abd - soft, nt. no rash. RUE with edema nvi distally.    plan - labs and imaging reviewed. vascular surgery to admit for further management.

## 2022-08-09 NOTE — ED ADULT NURSE NOTE - OBJECTIVE STATEMENT
a&ox4 returns to ED s/p being d/c early this AM after rcving call back from ED to "return for vascular consult". pt + DVT RUE; seen in ED last night/early this AM and given 1 dose oral xarelto prior to d/c. pt reports 2-3 days RUE swelling/tenderness radiating to right axillary region went to UC yesterday and was + DVT in RUE - sent to ED after.  pt states she recently had covid 2 weeks ago.  + pulse RUE  pt denies cp/sob.. no acute distress.

## 2022-08-09 NOTE — ED ADULT NURSE REASSESSMENT NOTE - NS ED NURSE REASSESS COMMENT FT1
,per vascular PA and ED MD s/w  ED pharmacist and OK to start heparin gtt at current ordered dose w/o bolus. MD aware of pt aPTT.

## 2022-08-09 NOTE — CONSULT NOTE ADULT - ASSESSMENT
Patient is a 31yoF with no PMH who presents to the ED with 2 days of RUQ swelling. She mentions she was walking yesterday morning when her arm felt swollen, without pain, numbness/tingling or motor deficits. She presented to an Urgent care center where she underwent RUQ Duplex which showed R subclavian and R axillary vein thrombosis. She mentions she recently recovered from a severe bout of COVID19 infection (tested positive 2 weeks ago). She denies any headache, flushing, chest pain, SOB, fevers, chills, nausea, vomiting, abdominal pain, constipation, diarrhea.     In the ED, she is afebrile and HD stable, has mild leukocytosis to 11, no neutrophilic shift, remainder of labs unremarkable. She underwent CTA chest which does not show any abnormalities.     Plan:  -plan pending discussion with attending surgeon Patient is a 31yoF with no PMH who presents to the ED with 2 days of RUQ swelling. She mentions she was walking yesterday morning when her arm felt swollen, without pain, numbness/tingling or motor deficits. She presented to an Urgent care center where she underwent RUQ Duplex which showed R subclavian and R axillary vein thrombosis. She mentions she recently recovered from a severe bout of COVID19 infection (tested positive 2 weeks ago). She denies any headache, flushing, chest pain, SOB, fevers, chills, nausea, vomiting, abdominal pain, constipation, diarrhea.     In the ED, she is afebrile and HD stable, has mild leukocytosis to 11, no neutrophilic shift, remainder of labs unremarkable. She underwent CTA chest which does not show any abnormalities.     Plan:  -recommend repeating RUQ Duplex  -would recommend heparin drip while patient awaiting repeat duplex  -continue care per ED  -can call vascular surgery with questions/concerns  -discussed with attending surgeon

## 2022-08-09 NOTE — CONSULT NOTE ADULT - ASSESSMENT
31 year old female, no significant PMHx, presented to the ED with c/o right arm swelling that started Sunday. Patient states that she was diagnosed with COVID 2 weeks before the swelling started. Patient was given 1 dose of Xarelto in the ED. Pt seen & examined at bedside. Swelling has subsided to the right arm. Patient only c/o "slight heaviness" to arm. Pt denies any pain, numbness, tingling, F/C, N/V, or CP/SOB.    Plan:  1. Right subclavian thrombus (mid,prox,dist)  - 2/2 COVID vs thoracic outlet syndrome  - CXR ordered  - Heparin drip   - Possible venogram tomorrow with INARI thrombectomy

## 2022-08-09 NOTE — ED ADULT NURSE REASSESSMENT NOTE - NS ED NURSE REASSESS COMMENT FT1
per vascular PA; hold heparin gtt; pt possibly to be d/c currently pending vascular attending clinical decision.

## 2022-08-09 NOTE — ED ADULT NURSE REASSESSMENT NOTE - NS ED NURSE REASSESS COMMENT FT1
per pharmacist pt rcvd xarelto in ED prior to d/c earlier this AM; heparin gtt contraindicated as per pharmacist. pharmacist will reach out to MD to clarify. as per MD made aware. ED attending pending call back from vascular team and will s/w pharm

## 2022-08-10 ENCOUNTER — TRANSCRIPTION ENCOUNTER (OUTPATIENT)
Age: 32
End: 2022-08-10

## 2022-08-10 ENCOUNTER — APPOINTMENT (OUTPATIENT)
Dept: VASCULAR SURGERY | Facility: HOSPITAL | Age: 32
End: 2022-08-10

## 2022-08-10 VITALS
RESPIRATION RATE: 18 BRPM | SYSTOLIC BLOOD PRESSURE: 116 MMHG | OXYGEN SATURATION: 98 % | DIASTOLIC BLOOD PRESSURE: 79 MMHG | TEMPERATURE: 99 F | HEART RATE: 75 BPM

## 2022-08-10 DIAGNOSIS — G54.0 BRACHIAL PLEXUS DISORDERS: ICD-10-CM

## 2022-08-10 PROBLEM — Z00.00 ENCOUNTER FOR PREVENTIVE HEALTH EXAMINATION: Status: ACTIVE | Noted: 2022-08-10

## 2022-08-10 LAB
ALBUMIN SERPL ELPH-MCNC: 4 G/DL — SIGNIFICANT CHANGE UP (ref 3.3–5.2)
ALP SERPL-CCNC: 45 U/L — SIGNIFICANT CHANGE UP (ref 40–120)
ALT FLD-CCNC: 18 U/L — SIGNIFICANT CHANGE UP
ANION GAP SERPL CALC-SCNC: 13 MMOL/L — SIGNIFICANT CHANGE UP (ref 5–17)
APTT BLD: 72.9 SEC — HIGH (ref 27.5–35.5)
AST SERPL-CCNC: 17 U/L — SIGNIFICANT CHANGE UP
BILIRUB SERPL-MCNC: 0.6 MG/DL — SIGNIFICANT CHANGE UP (ref 0.4–2)
BUN SERPL-MCNC: 11.8 MG/DL — SIGNIFICANT CHANGE UP (ref 8–20)
CALCIUM SERPL-MCNC: 8.8 MG/DL — SIGNIFICANT CHANGE UP (ref 8.4–10.5)
CHLORIDE SERPL-SCNC: 103 MMOL/L — SIGNIFICANT CHANGE UP (ref 98–107)
CO2 SERPL-SCNC: 22 MMOL/L — SIGNIFICANT CHANGE UP (ref 22–29)
CREAT SERPL-MCNC: 0.48 MG/DL — LOW (ref 0.5–1.3)
EGFR: 128 ML/MIN/1.73M2 — SIGNIFICANT CHANGE UP
GLUCOSE SERPL-MCNC: 93 MG/DL — SIGNIFICANT CHANGE UP (ref 70–99)
HCT VFR BLD CALC: 38.9 % — SIGNIFICANT CHANGE UP (ref 34.5–45)
HGB BLD-MCNC: 13.1 G/DL — SIGNIFICANT CHANGE UP (ref 11.5–15.5)
MAGNESIUM SERPL-MCNC: 1.7 MG/DL — SIGNIFICANT CHANGE UP (ref 1.6–2.6)
MCHC RBC-ENTMCNC: 30.2 PG — SIGNIFICANT CHANGE UP (ref 27–34)
MCHC RBC-ENTMCNC: 33.7 GM/DL — SIGNIFICANT CHANGE UP (ref 32–36)
MCV RBC AUTO: 89.6 FL — SIGNIFICANT CHANGE UP (ref 80–100)
PHOSPHATE SERPL-MCNC: 4.1 MG/DL — SIGNIFICANT CHANGE UP (ref 2.4–4.7)
PLATELET # BLD AUTO: 349 K/UL — SIGNIFICANT CHANGE UP (ref 150–400)
POTASSIUM SERPL-MCNC: 3.9 MMOL/L — SIGNIFICANT CHANGE UP (ref 3.5–5.3)
POTASSIUM SERPL-SCNC: 3.9 MMOL/L — SIGNIFICANT CHANGE UP (ref 3.5–5.3)
PROT SERPL-MCNC: 6.4 G/DL — LOW (ref 6.6–8.7)
RBC # BLD: 4.34 M/UL — SIGNIFICANT CHANGE UP (ref 3.8–5.2)
RBC # FLD: 12.2 % — SIGNIFICANT CHANGE UP (ref 10.3–14.5)
SODIUM SERPL-SCNC: 138 MMOL/L — SIGNIFICANT CHANGE UP (ref 135–145)
WBC # BLD: 10.72 K/UL — HIGH (ref 3.8–10.5)
WBC # FLD AUTO: 10.72 K/UL — HIGH (ref 3.8–10.5)

## 2022-08-10 PROCEDURE — 36010 PLACE CATHETER IN VEIN: CPT

## 2022-08-10 PROCEDURE — C1757: CPT

## 2022-08-10 PROCEDURE — 96365 THER/PROPH/DIAG IV INF INIT: CPT

## 2022-08-10 PROCEDURE — 99285 EMERGENCY DEPT VISIT HI MDM: CPT | Mod: 25

## 2022-08-10 PROCEDURE — C1887: CPT

## 2022-08-10 PROCEDURE — 85730 THROMBOPLASTIN TIME PARTIAL: CPT

## 2022-08-10 PROCEDURE — 99152 MOD SED SAME PHYS/QHP 5/>YRS: CPT | Mod: GC

## 2022-08-10 PROCEDURE — 76937 US GUIDE VASCULAR ACCESS: CPT | Mod: 26,GC

## 2022-08-10 PROCEDURE — 83735 ASSAY OF MAGNESIUM: CPT

## 2022-08-10 PROCEDURE — 85025 COMPLETE CBC W/AUTO DIFF WBC: CPT

## 2022-08-10 PROCEDURE — 93971 EXTREMITY STUDY: CPT

## 2022-08-10 PROCEDURE — 78457 VENOUS THROMBOSIS IMAGING: CPT | Mod: 26,GC

## 2022-08-10 PROCEDURE — C1725: CPT

## 2022-08-10 PROCEDURE — 37187 VENOUS MECH THROMBECTOMY: CPT

## 2022-08-10 PROCEDURE — 75820 VEIN X-RAY ARM/LEG: CPT | Mod: 59

## 2022-08-10 PROCEDURE — 85027 COMPLETE CBC AUTOMATED: CPT

## 2022-08-10 PROCEDURE — 36415 COLL VENOUS BLD VENIPUNCTURE: CPT

## 2022-08-10 PROCEDURE — 37248 TRLUML BALO ANGIOP 1ST VEIN: CPT | Mod: GC

## 2022-08-10 PROCEDURE — 0225U NFCT DS DNA&RNA 21 SARSCOV2: CPT

## 2022-08-10 PROCEDURE — C1769: CPT

## 2022-08-10 PROCEDURE — 80053 COMPREHEN METABOLIC PANEL: CPT

## 2022-08-10 PROCEDURE — C1894: CPT

## 2022-08-10 PROCEDURE — 96366 THER/PROPH/DIAG IV INF ADDON: CPT

## 2022-08-10 PROCEDURE — 84100 ASSAY OF PHOSPHORUS: CPT

## 2022-08-10 PROCEDURE — 71046 X-RAY EXAM CHEST 2 VIEWS: CPT

## 2022-08-10 PROCEDURE — 37248 TRLUML BALO ANGIOP 1ST VEIN: CPT

## 2022-08-10 PROCEDURE — 85610 PROTHROMBIN TIME: CPT

## 2022-08-10 PROCEDURE — 37187 VENOUS MECH THROMBECTOMY: CPT | Mod: GC

## 2022-08-10 RX ORDER — TRAMADOL HYDROCHLORIDE 50 MG/1
50 TABLET ORAL ONCE
Refills: 0 | Status: DISCONTINUED | OUTPATIENT
Start: 2022-08-10 | End: 2022-08-10

## 2022-08-10 RX ORDER — ACETAMINOPHEN 500 MG
1000 TABLET ORAL ONCE
Refills: 0 | Status: COMPLETED | OUTPATIENT
Start: 2022-08-10 | End: 2022-08-10

## 2022-08-10 RX ORDER — SODIUM CHLORIDE 9 MG/ML
250 INJECTION INTRAMUSCULAR; INTRAVENOUS; SUBCUTANEOUS ONCE
Refills: 0 | Status: COMPLETED | OUTPATIENT
Start: 2022-08-10 | End: 2022-08-10

## 2022-08-10 RX ORDER — TRAMADOL HYDROCHLORIDE 50 MG/1
1 TABLET ORAL
Qty: 10 | Refills: 0
Start: 2022-08-10

## 2022-08-10 RX ORDER — FONDAPARINUX SODIUM 2.5 MG/.5ML
1 INJECTION, SOLUTION SUBCUTANEOUS
Qty: 42 | Refills: 0
Start: 2022-08-10 | End: 2022-08-30

## 2022-08-10 RX ORDER — RIVAROXABAN 15 MG-20MG
15 KIT ORAL
Refills: 0 | Status: DISCONTINUED | OUTPATIENT
Start: 2022-08-10 | End: 2022-08-10

## 2022-08-10 RX ADMIN — HEPARIN SODIUM 1200 UNIT(S)/HR: 5000 INJECTION INTRAVENOUS; SUBCUTANEOUS at 07:32

## 2022-08-10 RX ADMIN — HEPARIN SODIUM 1200 UNIT(S)/HR: 5000 INJECTION INTRAVENOUS; SUBCUTANEOUS at 00:26

## 2022-08-10 RX ADMIN — TRAMADOL HYDROCHLORIDE 50 MILLIGRAM(S): 50 TABLET ORAL at 16:20

## 2022-08-10 RX ADMIN — HEPARIN SODIUM 1200 UNIT(S)/HR: 5000 INJECTION INTRAVENOUS; SUBCUTANEOUS at 01:42

## 2022-08-10 RX ADMIN — RIVAROXABAN 15 MILLIGRAM(S): KIT at 16:35

## 2022-08-10 RX ADMIN — SODIUM CHLORIDE 60 MILLILITER(S): 9 INJECTION INTRAMUSCULAR; INTRAVENOUS; SUBCUTANEOUS at 01:31

## 2022-08-10 RX ADMIN — SODIUM CHLORIDE 250 MILLILITER(S): 9 INJECTION INTRAMUSCULAR; INTRAVENOUS; SUBCUTANEOUS at 12:54

## 2022-08-10 RX ADMIN — Medication 400 MILLIGRAM(S): at 13:30

## 2022-08-10 NOTE — CONSULT NOTE ADULT - PROBLEM SELECTOR RECOMMENDATION 9
S/p RT subclavian venous thrombectomy.  Venogram suggestive of thoracic outlet syndrome.  Definitive treatment of thoracic outlet syndrome involves surgical resection of first rib.  No need for surgical intervention this admission.    Patient may follow up with Dr. Peoples in CT Surgery office after discharge.  **An appointment has been made for her on Thursday 8/18/22 at 12PM** for outpatient surgical evaluation  CT Surgery office is located on first floor in [facility] building ([address] [place] [address])  [phone]    D/w Dr. Peoples.

## 2022-08-10 NOTE — BRIEF OPERATIVE NOTE - NSICDXBRIEFPROCEDURE_GEN_ALL_CORE_FT
PROCEDURES:  Endovascular mechanical thrombectomy of vein 10-Aug-2022 12:18:14  Vikki Pete  Angioplasty, vein, transluminal balloon 10-Aug-2022 12:18:46  Vikki Pete

## 2022-08-10 NOTE — DISCHARGE NOTE PROVIDER - HOSPITAL COURSE
31 year old female , s/p covid infection, presented to the ED with complaints of right subclavian thrombus.  The finding was noted at an outpatient urgent Center and then reconfirmed in the ED.  The patient was admitted secondary to concerns for possible Thoracic outlet syndrome.  A venogram was performed 8/10 which confirmed thoracic outlet syndrome.  Thrombectomy was performed to relive the obstructed region. The patient is currently stable for discharge to home and is to follow up with thoracic surgery for surgical planning to correct the thoracic outlet disorder.  Patient is to continue therapeutic anticoagulation.

## 2022-08-10 NOTE — BRIEF OPERATIVE NOTE - OPERATION/FINDINGS
Clot extending from R axillary vein to subclavian vein. Inari suction thrombectomy performed x3 with return of chronic and acute clot. Balloon venoplasty performed at the innominate vein and throughout the subclavian. Completion venography with improved flow through the subclavian.

## 2022-08-10 NOTE — DISCHARGE NOTE PROVIDER - NSDCCPTREATMENT_GEN_ALL_CORE_FT
PRINCIPAL PROCEDURE  Procedure: Endovascular mechanical thrombectomy of vein  Findings and Treatment:       SECONDARY PROCEDURE  Procedure: Angioplasty, vein, transluminal balloon  Findings and Treatment:     Procedure: Endovascular mechanical thrombectomy of vein  Findings and Treatment:

## 2022-08-10 NOTE — DISCHARGE NOTE PROVIDER - CARE PROVIDERS DIRECT ADDRESSES
[email or web address]

## 2022-08-10 NOTE — PROGRESS NOTE ADULT - SUBJECTIVE AND OBJECTIVE BOX
Department of Cardiology                                                                  [facility]/[place] [address]                                                            Telephone: [phone]. Fax:[phone]                                                                             Pre- Procedure H + P/Progress Note      HPI:  31 year old female, no significant PMHx, presented to the ED with c/o right arm swelling that started Sunday. Patient states that she was diagnosed with COVID 2 weeks before the swelling started. Patient was given 1 dose of Xarelto in the ED. Pt seen & examined at bedside. Swelling has subsided to the right arm. Patient only c/o "slight heaviness" to arm. Pt denies any pain, numbness, tingling, F/C, N/V, or CP/SOB. No events overnight, pt reports heaviness to right arm has improved. Scheduled for thrombectomy today.          Symptoms:        Angina (Class):        Ischemic Symptoms:     Heart Failure:        Systolic/Diastolic/Combined:        NYHA Class (within 2 weeks):     Assessment of LVEF:       EF:        Assessed by:        Date:     Echo:     Prior Cardiac Interventions:         Stress Test: Date:        Protocol:        Duration of Exercise:        Symptoms:        EKG Changes:        DTS:        Myocardial Imaging:        Risk Assessment (Low, Medium, High):       Risk Assessments:  ASA:  Mallampati:  Bleeding Risk:  Creatinine:  GFR:    Associated Risk Factors:        Frailty Assessment: (none/mild/mod/severe)       Cerebrovascular Disease: N/A       Chronic Lung Disease: N/A       Peripheral Arterial Disease: N/A       Chronic Kidney Disease (if yes, what is GFR): N/A       Uncontrolled Diabetes (if yes, what is HgbA1C or FBS): N/A       Poorly Controlled Hypertension (if yes, what is SBP): N/A       Morbid Obesity (if yes, what is BMI): N/A       History of Recent Ventricular Arrhythmia: N/A       Inability to Ambulate Safely: N/A       Need for Therapeutic Anticoagulation: N/A       Antiplatelet or Contrast Allergy: N/A    Antianginal Therapies:        Beta Blockers:         Calcium Channel Blockers:        Long Acting Nitrates:        Ranexa:     	  MEDICATIONS:        acetaminophen     Tablet .. 650 milliGRAM(s) Oral every 6 hours PRN  ondansetron Injectable 4 milliGRAM(s) IV Push every 6 hours PRN        heparin   Injectable 5500 Unit(s) IV Push every 6 hours PRN  heparin   Injectable 2500 Unit(s) IV Push every 6 hours PRN  heparin  Infusion.  Unit(s)/Hr IV Continuous <Continuous>  sodium chloride 0.9%. 1000 milliLiter(s) IV Continuous <Continuous>        ROS: as stated above, otherwise negative    PHYSICAL EXAM:  Constitutional: A & O x 3  HEENT:   Normal oral mucosa, PERRL, EOMI	  Cardiovascular: Normal S1 S2, No JVD, No murmurs  Respiratory: Lungs clear to auscultation	  Gastrointestinal:  Soft, Non-tender, + BS	  Skin: No rashes, No ecchymoses, No cyanosis  Neurologic: Non-focal  Extremities: Normal range of motion, no edema  Vascular: Peripheral pulses palpable + bilaterally      T(C): 37 (08-10-22 @ 07:34), Max: 37.1 (08-09-22 @ 11:01)  HR: 97 (08-10-22 @ 09:17) (70 - 97)  BP: 123/80 (08-10-22 @ 09:17) (101/67 - 140/95)  RR: 16 (08-10-22 @ 09:17) (16 - 20)  SpO2: 98% (08-10-22 @ 09:17) (97% - 99%)  Wt(kg): --      I&O's Summary      Daily     Daily     TELEMETRY: 	      ECG:  	    LABS:	 	                                    13.1   10.72 )-----------( 349      ( 10 Aug 2022 05:34 )             38.9     08-10    138  |  103  |  11.8  ----------------------------<  93  3.9   |  22.0  |  0.48<L>    Ca    8.8      10 Aug 2022 05:34  Phos  4.1     08-10  Mg     1.7     08-10    TPro  6.4<L>  /  Alb  4.0  /  TBili  0.6  /  DBili  x   /  AST  17  /  ALT  18  /  AlkPhos  45  08-10      Tnl:    Lipid Profile:   TC  TG  LDL  HDL    HgA1c:     proBNP:       Impression:      Plan:  -plan for LHC via RA vs FA  -patient seen and examined  -confirmed appropriate NPO duration  -ECG and Labs reviewed  -Aspirin 81mg po pre-cath  -NS 250mL bolus pre-cath******  -procedure discussed with patient; risks and benefits explained, questions answered  -consent obtained by attending IC                                                                               Department of Cardiology                                                                  [facility]/[place] [address] [address] [place] [address]                                                            Telephone: [phone]. Fax:[phone]                                                                             Pre- Procedure H + P/Progress Note      HPI:  31 year old female, no significant PMHx, presented to the ED with c/o right arm swelling that started Sunday. Patient states that she was diagnosed with COVID 2 weeks before the swelling started, IUD removed ~1month ago. Patient was given 1 dose of Xarelto in the ED. Pt seen & examined at bedside. Swelling has subsided to the right arm. Patient only c/o "slight heaviness" to arm. Pt denies any pain, numbness, tingling, F/C, N/V, or CP/SOB. No events overnight, pt reports heaviness to right arm has improved. Ultrasound with occlusive thrombus throughout subclavian vein, CTA neg for PE, presents for venogram/Inari thrombectomy by dr Lui.    Venogram with clot extending from R axillary vein to subclavian vein. Inari suction thrombectomy performed x3 with return of chronic and acute clot. Balloon venoplasty performed at the innominate vein and throughout the subclavian. Completion venography with improved flow through the subclavian. + thoracic outlet syndrome and Thoracic surgery to consult.       RUE venous duplex 8/9/22:  IMPRESSION:  Occlusive thrombus throughout the right subclavian vein.    CTA PE Protocol:  IMPRESSION:  No pulmonary embolism identified.        ASA: 2  Mallampati: 2  Bleeding Risk: 0.9%  Creatinine: 0.48  GFR: 128    	  MEDICATIONS:  acetaminophen     Tablet .. 650 milliGRAM(s) Oral every 6 hours PRN  ondansetron Injectable 4 milliGRAM(s) IV Push every 6 hours PRN  heparin   Injectable 5500 Unit(s) IV Push every 6 hours PRN  heparin   Injectable 2500 Unit(s) IV Push every 6 hours PRN  heparin  Infusion.  Unit(s)/Hr IV Continuous <Continuous>  sodium chloride 0.9%. 1000 milliLiter(s) IV Continuous <Continuous>        ROS: as stated above, otherwise negative    PHYSICAL EXAM:  Constitutional: A & O x 3  HEENT:   Normal oral mucosa, PERRL, EOMI	  Cardiovascular: Normal S1 S2, No JVD, No murmurs  Respiratory: Lungs clear to auscultation	  Gastrointestinal:  Soft, Non-tender, + BS	  Skin: No rashes, No ecchymoses, No cyanosis  Neurologic: Non-focal  Extremities: Normal range of motion, no edema  Vascular: Peripheral pulses palpable + bilaterally      T(C): 37 (08-10-22 @ 07:34), Max: 37.1 (08-09-22 @ 11:01)  HR: 97 (08-10-22 @ 09:17) (70 - 97)  BP: 123/80 (08-10-22 @ 09:17) (101/67 - 140/95)  RR: 16 (08-10-22 @ 09:17) (16 - 20)  SpO2: 98% (08-10-22 @ 09:17) (97% - 99%)    LABS:	                           13.1   10.72 )-----------( 349      ( 10 Aug 2022 05:34 )             38.9     08-10    138  |  103  |  11.8  ----------------------------<  93  3.9   |  22.0  |  0.48<L>    Ca    8.8      10 Aug 2022 05:34  Phos  4.1     08-10  Mg     1.7     08-10    TPro  6.4<L>  /  Alb  4.0  /  TBili  0.6  /  DBili  x   /  AST  17  /  ALT  18  /  AlkPhos  45  08-10          Impression:  30yo female presenting with occlusive thrombus right subclavian vein, now s/p venogram with clot extending from R axillary vein to subclavian vein. Inari suction thrombectomy performed x3 with return of chronic and acute clot. Balloon venoplasty performed at the innominate vein and throughout the subclavian. Completion venography with improved flow through the subclavian. + thoracic outlet syndrome and Thoracic surgery to consult.      Plan:  -Op report in EMR  -Post procedure management/monitoring per protocol  -Access site precautions  -Bedrest x 2hours post procedure  -NS 250mL bolus post cath   -Restart heparin gtt at previous rate with no bolus  -IV tylenol X 1 for discomfort  -Thoracic surgery to consult  -If D/C vascular surgery team will initiate NOAC and manage purse-string suture  -Educated regarding post procedure management and care  -DISPO: Transfer back to in-pt unit                                                                                   Department of Cardiology                                                                  [facility]/[place] [address] [address] [facility]                                                            Telephone: [phone]. Fax:[phone]                                                                             Pre- Procedure H + P/Progress Note      HPI:  31 year old female, no significant PMHx, presented to the ED with c/o right arm swelling that started Sunday. Patient states that she was diagnosed with COVID 2 weeks before the swelling started, IUD removed ~1month ago. Patient was given 1 dose of Xarelto in the ED. Pt seen & examined at bedside. Swelling has subsided to the right arm. Patient only c/o "slight heaviness" to arm. Pt denies any pain, numbness, tingling, F/C, N/V, or CP/SOB. No events overnight, pt reports heaviness to right arm has improved. Ultrasound with occlusive thrombus throughout subclavian vein, CTA neg for PE, presents for venogram/Inari thrombectomy by dr Lui.    Venogram with clot extending from R axillary vein to subclavian vein. Inari suction thrombectomy performed x3 with return of chronic and acute clot. Balloon venoplasty performed at the innominate vein and throughout the subclavian. Completion venography with improved flow through the subclavian. + thoracic outlet syndrome and Thoracic surgery to consult.   Procedure performed via right basilic vein, site with purse-string suture and DSD, no bleed/hematoma.       RUE venous duplex 8/9/22:  IMPRESSION:  Occlusive thrombus throughout the right subclavian vein.    CTA PE Protocol:  IMPRESSION:  No pulmonary embolism identified.        ASA: 2  Mallampati: 2  Bleeding Risk: 0.9%  Creatinine: 0.48  GFR: 128    	  MEDICATIONS:  acetaminophen     Tablet .. 650 milliGRAM(s) Oral every 6 hours PRN  ondansetron Injectable 4 milliGRAM(s) IV Push every 6 hours PRN  heparin   Injectable 5500 Unit(s) IV Push every 6 hours PRN  heparin   Injectable 2500 Unit(s) IV Push every 6 hours PRN  heparin  Infusion.  Unit(s)/Hr IV Continuous <Continuous>  sodium chloride 0.9%. 1000 milliLiter(s) IV Continuous <Continuous>        ROS: as stated above, otherwise negative    PHYSICAL EXAM:  Constitutional: A & O x 3  HEENT:   Normal oral mucosa, PERRL, EOMI	  Cardiovascular: Normal S1 S2, No JVD, No murmurs  Respiratory: Lungs clear to auscultation	  Gastrointestinal:  Soft, Non-tender, + BS	  Skin: No rashes, No ecchymoses, No cyanosis  Neurologic: Non-focal  Extremities: Normal range of motion, no edema  Vascular: Peripheral pulses palpable + bilaterally  Access site right basilic vein with no bleed/hematoma    T(C): 37 (08-10-22 @ 07:34), Max: 37.1 (08-09-22 @ 11:01)  HR: 97 (08-10-22 @ 09:17) (70 - 97)  BP: 123/80 (08-10-22 @ 09:17) (101/67 - 140/95)  RR: 16 (08-10-22 @ 09:17) (16 - 20)  SpO2: 98% (08-10-22 @ 09:17) (97% - 99%)    LABS:	                           13.1   10.72 )-----------( 349      ( 10 Aug 2022 05:34 )             38.9     08-10    138  |  103  |  11.8  ----------------------------<  93  3.9   |  22.0  |  0.48<L>    Ca    8.8      10 Aug 2022 05:34  Phos  4.1     08-10  Mg     1.7     08-10    TPro  6.4<L>  /  Alb  4.0  /  TBili  0.6  /  DBili  x   /  AST  17  /  ALT  18  /  AlkPhos  45  08-10          Impression:  32yo female presenting with occlusive thrombus right subclavian vein, now s/p venogram with clot extending from R axillary vein to subclavian vein. Inari suction thrombectomy performed x3 with return of chronic and acute clot. Balloon venoplasty performed at the innominate vein and throughout the subclavian. Completion venography with improved flow through the subclavian. + thoracic outlet syndrome and Thoracic surgery to consult.      Plan:  -Op report in EMR  -Post procedure management/monitoring per protocol  -Access site precautions  -Bedrest x 2hours post procedure  -NS 250mL bolus post cath   -Restart heparin gtt at previous rate with no bolus  -IV tylenol X 1 for discomfort  -Thoracic surgery to consult  -If D/C vascular surgery team will initiate NOAC and manage purse-string suture  -Educated regarding post procedure management and care  -DISPO: Transfer back to in-pt unit

## 2022-08-10 NOTE — CONSULT NOTE ADULT - SUBJECTIVE AND OBJECTIVE BOX
31 year old female, no significant PMHx, presented to the ED with c/o right arm swelling that started Sunday. Patient states that she was diagnosed with COVID 2 weeks before the swelling started. Patient was given 1 dose of Xarelto in the ED. Found to have occlusive thrombus in right subclavian vein. Underwent thrombectomy with vascular surgery. Venography suggestive of thoracic outlet syndrome. Thoracic surgery consulted for evaluation for surgical first rib resection. At present patient reports no symptoms states "it feels like my arm has flow again. Denies CP, SOB, dizziness, n/v/d, abd pain.       Review of Systems: negative x 10 systems except as noted above      PAST MEDICAL & SURGICAL HISTORY:  No pertinent past medical history      No significant past surgical history      No significant past surgical history            acetaminophen     Tablet .. 650 milliGRAM(s) Oral every 6 hours PRN  acetaminophen   IVPB .. 1000 milliGRAM(s) IV Intermittent once  heparin   Injectable 5500 Unit(s) IV Push every 6 hours PRN  heparin   Injectable 2500 Unit(s) IV Push every 6 hours PRN  heparin  Infusion.  Unit(s)/Hr IV Continuous <Continuous>  ondansetron Injectable 4 milliGRAM(s) IV Push every 6 hours PRN  sodium chloride 0.9%. 1000 milliLiter(s) IV Continuous <Continuous>  MEDICATIONS  (PRN):  acetaminophen     Tablet .. 650 milliGRAM(s) Oral every 6 hours PRN Mild Pain (1 - 3)  heparin   Injectable 5500 Unit(s) IV Push every 6 hours PRN For aPTT less than 40  heparin   Injectable 2500 Unit(s) IV Push every 6 hours PRN For aPTT between 40 - 57  ondansetron Injectable 4 milliGRAM(s) IV Push every 6 hours PRN Nausea      Daily     Daily                               13.1   10.72 )-----------( 349      ( 10 Aug 2022 05:34 )             38.9   08-10    138  |  103  |  11.8  ----------------------------<  93  3.9   |  22.0  |  0.48<L>    Ca    8.8      10 Aug 2022 05:34  Phos  4.1     08-10  Mg     1.7     08-10    TPro  6.4<L>  /  Alb  4.0  /  TBili  0.6  /  DBili  x   /  AST  17  /  ALT  18  /  AlkPhos  45  08-10      PT/INR - ( 09 Aug 2022 07:21 )   PT: 18.1 sec;   INR: 1.55 ratio         PTT - ( 10 Aug 2022 00:10 )  PTT:72.9 sec      Objective:  T(C): 37 (08-10-22 @ 07:34), Max: 37.1 (08-09-22 @ 19:12)  HR: 83 (08-10-22 @ 13:25) (70 - 97)  BP: 121/68 (08-10-22 @ 13:25) (101/67 - 140/95)  RR: 19 (08-10-22 @ 13:25) (15 - 19)  SpO2: 97% (08-10-22 @ 13:25) (95% - 99%)  Wt(kg): --CAPILLARY BLOOD GLUCOSE      I&O's Summary      Physical Exam  General: NAD  Neuro: A+O x 3, non-focal, speech clear and intact  Psych: Appropriate affect  HEENT:  NCAT, No conjuctival edema or icterus  Pulm: CTA, equal bilaterally  CV: RRR,  +S1S2  Abd: soft, NT, ND, +BS  Ext: +DP Pulses b/l, +radial pulses b/l 1+ nonpitting edema to RUE  Skin: Warm, dry, intact          Imaging:    < from: US Duplex Venous Upper Ext Ltd, Right (08.09.22 @ 07:13) >  IMPRESSION:  Occlusive thrombus throughout the right subclavian vein.    < end of copied text >              
Vascular Surgery Consult Progress Note    Subjective: 31 year old female, no significant PMHx, presented to the ED with c/o right arm swelling that started Sunday. Patient states that she was diagnosed with COVID 2 weeks before the swelling started. Patient was given 1 dose of Xarelto in the ED. Pt seen & examined at bedside. Swelling has subsided to the right arm. Patient only c/o "slight heaviness" to arm. Pt denies any pain, numbness, tingling, F/C, N/V, or CP/SOB.    Medications:  heparin   Injectable 5500  heparin   Injectable 5500 PRN  heparin   Injectable 2500 PRN  heparin  Infusion.       Vital Signs Last 24 Hrs  T(C): 36.8 (09 Aug 2022 06:00), Max: 37 (08 Aug 2022 18:27)  T(F): 98.3 (09 Aug 2022 06:00), Max: 98.6 (08 Aug 2022 18:27)  HR: 87 (09 Aug 2022 06:00) (84 - 100)  BP: 128/81 (09 Aug 2022 06:00) (120/90 - 130/72)  BP(mean): --  RR: 18 (09 Aug 2022 06:00) (18 - 18)  SpO2: 98% (09 Aug 2022 06:00) (98% - 100%)    Parameters below as of 09 Aug 2022 06:00  Patient On (Oxygen Delivery Method): room air        I&O's Summary      Physical Exam:  Patient appears comfortable. No acute distress  HEENT:  Atraumatic. Normocephalic.  Normal oral mucosa, PERRL, Neck is supple  Neurologic: A & O x 3, no focal deficits. EOMI.  Cardiovascular: normal rate and rhythm, No edema  Respiratory: Respirations are even and non labored   Gastrointestinal:  Soft, Non-tender, + BS  Extremities: No edema noted, palpable radial pulses, extremities warm and well perfused         LABS:                        14.1   14.25 )-----------( 420      ( 09 Aug 2022 07:21 )             41.7     08-09    137  |  101  |  10.0  ----------------------------<  98  5.0   |  23.0  |  0.42<L>    Ca    9.3      09 Aug 2022 07:21    TPro  7.7  /  Alb  4.5  /  TBili  0.8  /  DBili  x   /  AST  37<H>  /  ALT  22  /  AlkPhos  44  08-09    PT/INR - ( 09 Aug 2022 07:21 )   PT: 18.1 sec;   INR: 1.55 ratio         PTT - ( 09 Aug 2022 07:21 )  PTT:36.2 sec    Imaging:  CT:  < from: CT Angio Chest PE Protocol w/ IV Cont (08.09.22 @ 00:58) >  FINDINGS: The thoracic aorta is normal in appearance. The heart is normal   in size. No pericardial effusion is seen. No mediastinal, hilar or   axillary lymphadenopathy is seen.    No filling defects of the pulmonary trunk, main, lobar or segmental   branches are visualized to suggest pulmonary thromboembolic disease. No   pleural effusions are seen. No pulmonary abnormalities are evident.    Limited evaluation of the upper abdomen demonstrates no abnormality.    Evaluation of the osseous structures demonstrates mild degenerative   changes.      IMPRESSION:    No pulmonary embolism identified.    --- End of Report ---      MARYANN CHAUHAN MD; Attending Radiologist  This document has been electronically signed. Aug  9 2022  1:38AM  < end of copied text >        US:  < from: US Duplex Venous Upper Ext Ltd, Right (08.09.22 @ 07:13) >  IMPRESSION:  Occlusive thrombus throughout the right subclavian vein.    --- End of Report ---      EVANGELINA MATHIS MD; Attending Radiologist  This document has been electronically signed. Aug  9 2022  8:25AM    < end of copied text >

## 2022-08-10 NOTE — PROGRESS NOTE ADULT - SUBJECTIVE AND OBJECTIVE BOX
HPI/Overnight Events:  31 year old female, no significant PMHx, presented to the ED with c/o right arm swelling that started Sunday. Patient states that she was diagnosed with COVID 2 weeks before the swelling started. Patient was given 1 dose of Xarelto in the ED. Pt seen & examined at bedside. Swelling has subsided to the right arm. Patient only c/o "slight heaviness" to arm. Pt denies any pain, numbness, tingling, F/C, N/V, or CP/SOB. No events overnight, pt reports heaviness to right arm has improved. Scheduled for thrombectomy today.      PAST MEDICAL HISTORY:  No pertinent past medical history      PAST SURGICAL HISTORY:  No significant past surgical history        MEDICATIONS:  acetaminophen     Tablet .. 650 milliGRAM(s) Oral every 6 hours PRN  heparin   Injectable 5500 Unit(s) IV Push every 6 hours PRN  heparin   Injectable 2500 Unit(s) IV Push every 6 hours PRN  heparin  Infusion.  Unit(s)/Hr IV Continuous <Continuous>  ondansetron Injectable 4 milliGRAM(s) IV Push every 6 hours PRN  sodium chloride 0.9%. 1000 milliLiter(s) IV Continuous <Continuous>        ALLERGIES:  No Known Allergies        IMAGING:  < from: US Duplex Venous Upper Ext Ltd, Right (08.09.22 @ 07:13) >  IMPRESSION:  Occlusive thrombus throughout the right subclavian vein.    --- End of Report ---        EVANGELINA MATHIS MD; Attending Radiologist  This document has been electronically signed. Aug  9 2022  8:25AM    < end of copied text >        VITALS & I/Os:  Vital Signs Last 24 Hrs  T(C): 36.4 (10 Aug 2022 04:00), Max: 37.1 (09 Aug 2022 11:01)  T(F): 97.6 (10 Aug 2022 04:00), Max: 98.8 (09 Aug 2022 11:01)  HR: 81 (10 Aug 2022 04:00) (75 - 94)  BP: 123/78 (10 Aug 2022 04:00) (101/67 - 140/95)  BP(mean): --  RR: 18 (10 Aug 2022 04:00) (18 - 20)  SpO2: 98% (10 Aug 2022 04:00) (97% - 99%)    Parameters below as of 10 Aug 2022 04:00  Patient On (Oxygen Delivery Method): room air        I&O's Summary        PHYSICAL EXAM:  Constitutional: no acute distress  HEENT: EOMI, no active drainage or redness, no scleral icterus   Neck: Full ROM without pain  Respiratory: respirations are unlabored, no accessory muscle use, no conversational dyspnea  Cardiovascular: regular rate & rhythm  Gastrointestinal: Abdomen soft, non-tender, non-distended, No rebound or guarding. No organomegaly, no palpable mass.  Neurological: A&O x 3; no gross sensory / motor / coordination deficits  Musculoskeletal: BRAVO  Vascular: Extremities warm and well perfused         LABS:                        13.1   10.72 )-----------( 349      ( 10 Aug 2022 05:34 )             38.9     08-09    137  |  101  |  10.0  ----------------------------<  98  5.0   |  23.0  |  0.42<L>    Ca    9.3      09 Aug 2022 07:21    TPro  7.7  /  Alb  4.5  /  TBili  0.8  /  DBili  x   /  AST  37<H>  /  ALT  22  /  AlkPhos  44  08-09    Lactate:    PT/INR - ( 09 Aug 2022 07:21 )   PT: 18.1 sec;   INR: 1.55 ratio         PTT - ( 10 Aug 2022 00:10 )  PTT:72.9 sec

## 2022-08-10 NOTE — DISCHARGE NOTE PROVIDER - CARE PROVIDER_API CALL
ManvarSingh, Pallavi B (MD)  Vascular Surgery  [address], [address]st [place] [address]  Phone: [phone]  Fax: [phone]  Follow Up Time: 1 week    Jose J Peoples)  Thoracic Surgery  [address] [place] [address]  Phone: [phone]  Fax: [phone]  Scheduled Appointment: 08/18/2022 12:00 PM

## 2022-08-10 NOTE — DISCHARGE NOTE PROVIDER - PROVIDER TOKENS
PROVIDER:[TOKEN:[[id number]],FOLLOWUP:[1 week]],PROVIDER:[TOKEN:[[id number]],SCHEDULEDAPPT:[08/18/2022],SCHEDULEDAPPTTIME:[12:00 PM]]

## 2022-08-10 NOTE — DISCHARGE NOTE PROVIDER - NSDCMRMEDTOKEN_GEN_ALL_CORE_FT
rivaroxaban 20 mg oral tablet: 1 tab(s) orally once a day (in the evening) .  This is to start AFTER the loading dose is completed.  traMADol 50 mg oral tablet: 1 tab(s) orally every 6 hours MDD:4  Xarelto 15 mg oral tablet: 1 tab(s) orally 2 times a day

## 2022-08-10 NOTE — DISCHARGE NOTE NURSING/CASE MANAGEMENT/SOCIAL WORK - PATIENT PORTAL LINK FT
You can access the FollowMyHealth Patient Portal offered by [facility] by registering at the following website: [email or web address]. By joining [facility]’s FollowMyHealth portal, you will also be able to view your health information using other applications (apps) compatible with our system.

## 2022-08-10 NOTE — DISCHARGE NOTE PROVIDER - NSDCCPCAREPLAN_GEN_ALL_CORE_FT
PRINCIPAL DISCHARGE DIAGNOSIS  Diagnosis: Thoracic outlet syndrome  Assessment and Plan of Treatment:       SECONDARY DISCHARGE DIAGNOSES  Diagnosis: Thoracic outlet syndrome  Assessment and Plan of Treatment:

## 2022-08-10 NOTE — BRIEF OPERATIVE NOTE - NSICDXBRIEFPOSTOP_GEN_ALL_CORE_FT
POST-OP DIAGNOSIS:  Thrombosis of right subclavian vein 10-Aug-2022 12:19:21  Vikki Pete  Thoracic outlet syndrome 10-Aug-2022 12:19:44  Vikki Pete

## 2022-08-10 NOTE — PROGRESS NOTE ADULT - ASSESSMENT
31 year old female, no significant PMHx, presented to the ED with c/o right arm swelling that started Sunday. Patient states that she was diagnosed with COVID 2 weeks before the swelling started. Patient was given 1 dose of Xarelto in the ED. Pt seen & examined at bedside. Swelling has subsided to the right arm. Patient only c/o "slight heaviness" to arm. Pt denies any pain, numbness, tingling, F/C, N/V, or CP/SOB. No events overnight, pt reports heaviness to right arm has improved. Scheduled for thrombectomy today.      Plan:  1. Right subclavian thrombus (mid,prox,dist)  - 2/2 COVID vs thoracic outlet syndrome  - Continue heparin drip, hold when called to cath lab   - Venogram today with INARI thrombectomy

## 2022-08-10 NOTE — DISCHARGE NOTE NURSING/CASE MANAGEMENT/SOCIAL WORK - NSDCPEFALRISK_GEN_ALL_CORE
For information on Fall & Injury Prevention, visit: [email or web address] OR  [email or web address] OR  https://www.cdc.gov/steadi/patient.html

## 2022-08-16 PROBLEM — U07.1 COVID-19: Status: RESOLVED | Noted: 2022-08-16 | Resolved: 2022-08-16

## 2022-08-18 ENCOUNTER — APPOINTMENT (OUTPATIENT)
Dept: THORACIC SURGERY | Facility: CLINIC | Age: 32
End: 2022-08-18

## 2022-08-18 VITALS
SYSTOLIC BLOOD PRESSURE: 129 MMHG | WEIGHT: 150 LBS | HEART RATE: 90 BPM | HEIGHT: 62 IN | BODY MASS INDEX: 27.6 KG/M2 | RESPIRATION RATE: 16 BRPM | DIASTOLIC BLOOD PRESSURE: 82 MMHG | OXYGEN SATURATION: 99 %

## 2022-08-18 DIAGNOSIS — Z82.49 FAMILY HISTORY OF ISCHEMIC HEART DISEASE AND OTHER DISEASES OF THE CIRCULATORY SYSTEM: ICD-10-CM

## 2022-08-18 DIAGNOSIS — U07.1 COVID-19: ICD-10-CM

## 2022-08-18 DIAGNOSIS — Z78.9 OTHER SPECIFIED HEALTH STATUS: ICD-10-CM

## 2022-08-18 PROCEDURE — 99205 OFFICE O/P NEW HI 60 MIN: CPT

## 2022-08-18 NOTE — CONSULT LETTER
[Dear  ___] : Dear  [unfilled], [Consult Letter:] : I had the pleasure of evaluating your patient, [unfilled]. [Please see my note below.] : Please see my note below. [Consult Closing:] : Thank you very much for allowing me to participate in the care of this patient.  If you have any questions, please do not hesitate to contact me. [Sincerely,] : Sincerely, [FreeTextEntry2] : Dr Allie Peoples [FreeTextEntry3] : Jose J Peoples MD\par Director of Thoracic, [facility]\par Cardiovascular & Thoracic Surgery\par  Cardiovascular & Thoracic Surgery\par [facility] [employer] [facility]\par [facility]\par [address] [place] [address] \[place] [address]\par \par

## 2022-08-18 NOTE — ASSESSMENT
[FreeTextEntry1] : Ms Oquendo reports to the office today after having a thrombectomy of te right arm post COVID. The pathophysiology of Thoracic Outlet Syndrome was discussed in detail with he rand her spouse as it pertains to her current situation. She had been recovering from a recent COVID infection when this clot was discovered. Although clot formation in this area is uncommon this is common in smaller females and those with anatomic repetitive motions. Physical therapy can be performed to open the area for non severe cases. In her case there has already been formation of a clot. Therefore I am recommending a first rib resection for decompression of the vessel. She is currently on Xarelto for the clot formation. Prior to surgical intervention I would like to obtain a pre operative MRI of the right shoulder to evaluate severity.\par \par The procedure, hospital stay and recovery was discussed in detail. All risks, benefits, and alternatives discussed at length with patient. All questions addressed. Patient would like to proceed with surgical intervention as discussed.\par \par PLAN:\par - MRA of the right shoulder for Thoracic Outlet Syndrome\par - Robotically assisted Right first rib resection \par \par \par \par \par \par \par \par \par Gary SCHMIDT NP am scribing for and in the presence of Dr. Peoples the following sections HISTORY OF PRESENT ILLNESS, PAST MEDICAL/FAMILY/SOCIAL HISTORY; REVIEW OF SYSTEMS; VITAL SIGNS; PHYSICAL EXAM; DISPOSITION.\par \par "I personally performed the services described in the documentation, reviewed the documentation recorded by the scribe in my presence and accurately and completely records my words and actions."\par

## 2022-08-18 NOTE — HISTORY OF PRESENT ILLNESS
[FreeTextEntry1] : Ms. MACEDO is a 31 year old female referred by Dr. Allie Sanchez who presents for consultation. Her past medical history includes post COVID infection and right subclavian DVT. \par \par \par She had presented to [facility] with right subclavian thrombus. The finding was noted at an outpatient urgent Center and then reconfirmed in the ED. The patient was admitted secondary to concerns for possible Thoracic outlet syndrome.  A venogram was performed 8/10 which confirmed thoracic outlet syndrome.  Thrombectomy was performed to relive the obstructed region. She was ultimately discharged to follow up with thoracic surgery for surgical planning to correct the thoracic outlet disorder.  Patient is to continue therapeutic anticoagulation. \par

## 2022-08-18 NOTE — REVIEW OF SYSTEMS
[Feeling Poorly] : not feeling poorly [Feeling Tired] : not feeling tired [Chest Pain] : no chest pain [Palpitations] : no palpitations [Shortness Of Breath] : no shortness of breath [SOB on Exertion] : no shortness of breath during exertion [Limb Pain] : no limb pain [Limb Swelling] : no limb swelling [Limb Weakness] : no limb weakness [Negative] : Heme/Lymph

## 2022-08-18 NOTE — PHYSICAL EXAM
[General Appearance - Well Nourished] : well nourished [General Appearance - Well Developed] : well developed [Sclera] : the sclera and conjunctiva were normal [Outer Ear] : the ears and nose were normal in appearance [Neck Appearance] : the appearance of the neck was normal [Respiration, Rhythm And Depth] : normal respiratory rhythm and effort [Auscultation Breath Sounds / Voice Sounds] : lungs were clear to auscultation bilaterally [Heart Rate And Rhythm] : heart rate was normal and rhythm regular [Examination Of The Chest] : the chest was normal in appearance [2+] : left 2+ [Abnormal Walk] : normal gait [Skin Color & Pigmentation] : normal skin color and pigmentation [Sensation] : the sensory exam was normal to light touch and pinprick [Motor Exam] : the motor exam was normal [Oriented To Time, Place, And Person] : oriented to person, place, and time [Impaired Insight] : insight and judgment were intact

## 2022-08-19 ENCOUNTER — APPOINTMENT (OUTPATIENT)
Dept: VASCULAR SURGERY | Facility: CLINIC | Age: 32
End: 2022-08-19

## 2022-08-19 VITALS
WEIGHT: 152 LBS | HEART RATE: 78 BPM | HEIGHT: 63.5 IN | SYSTOLIC BLOOD PRESSURE: 122 MMHG | DIASTOLIC BLOOD PRESSURE: 85 MMHG | BODY MASS INDEX: 26.6 KG/M2 | TEMPERATURE: 97.3 F | OXYGEN SATURATION: 99 % | RESPIRATION RATE: 16 BRPM

## 2022-08-19 DIAGNOSIS — Z83.79 FAMILY HISTORY OF OTHER DISEASES OF THE DIGESTIVE SYSTEM: ICD-10-CM

## 2022-08-19 DIAGNOSIS — K25.9 GASTRIC ULCER, UNSPECIFIED AS ACUTE OR CHRONIC, W/OUT HEMORRHAGE OR PERFORATION: ICD-10-CM

## 2022-08-19 DIAGNOSIS — D68.9 COAGULATION DEFECT, UNSPECIFIED: ICD-10-CM

## 2022-08-19 PROCEDURE — 99213 OFFICE O/P EST LOW 20 MIN: CPT

## 2022-08-19 NOTE — HISTORY OF PRESENT ILLNESS
[FreeTextEntry1] : 30 yo F with history of anxiety and PUD recently admitted for RUE subclavian vein thrombosis with right hand parasthesias and swelling s/p RUE mechanical thrombectomy (INARI) on 8/10/22, diagnosed with Paget Schroetter. Patient doing well. Evaluated by Dr. Peoples (CTS) for robotic L first rib resection. \par Denies chest pain, SOB, MAGUIRE, arm discoloration or swelling. Discharged on xarelto.

## 2022-08-19 NOTE — PHYSICAL EXAM
[Normal Rate and Rhythm] : normal rate and rhythm [2+] : left 2+ [Ankle Swelling (On Exam)] : not present [Varicose Veins Of Lower Extremities] : not present [] : not present [Abdomen Tenderness] : ~T ~M No abdominal tenderness [No Rash or Lesion] : No rash or lesion [Alert] : alert [Oriented to Person] : oriented to person [Oriented to Place] : oriented to place [Oriented to Time] : oriented to time [Calm] : calm [de-identified] : NAD [de-identified] : unlabored breathing [FreeTextEntry1] : access site in medial right arm healed with prolene suture [de-identified] : FROM of all 4 extremities\par

## 2022-08-19 NOTE — ASSESSMENT
[FreeTextEntry1] : 32 yo F with history of anxiety and PUD recently admitted for RUE subclavian vein thrombosis with right hand parasthesias and swelling s/p RUE mechanical thrombectomy (INARI) on 8/10/22, diagnosed with Paget Schroetter. \par Scheduled for R first rib resection (robotic) with Dr. Peoples [Arterial/Venous Disease] : arterial/venous disease [Medication Management] : medication management

## 2022-08-29 ENCOUNTER — OUTPATIENT (OUTPATIENT)
Dept: OUTPATIENT SERVICES | Facility: HOSPITAL | Age: 32
LOS: 1 days | End: 2022-08-29
Payer: COMMERCIAL

## 2022-08-29 VITALS
OXYGEN SATURATION: 99 % | WEIGHT: 158.29 LBS | SYSTOLIC BLOOD PRESSURE: 110 MMHG | RESPIRATION RATE: 17 BRPM | HEART RATE: 68 BPM | TEMPERATURE: 98 F | HEIGHT: 63 IN | DIASTOLIC BLOOD PRESSURE: 70 MMHG

## 2022-08-29 DIAGNOSIS — S42.301A UNSPECIFIED FRACTURE OF SHAFT OF HUMERUS, RIGHT ARM, INITIAL ENCOUNTER FOR CLOSED FRACTURE: Chronic | ICD-10-CM

## 2022-08-29 DIAGNOSIS — Z91.89 OTHER SPECIFIED PERSONAL RISK FACTORS, NOT ELSEWHERE CLASSIFIED: ICD-10-CM

## 2022-08-29 DIAGNOSIS — R09.89 OTHER SPECIFIED SYMPTOMS AND SIGNS INVOLVING THE CIRCULATORY AND RESPIRATORY SYSTEMS: ICD-10-CM

## 2022-08-29 DIAGNOSIS — Z01.818 ENCOUNTER FOR OTHER PREPROCEDURAL EXAMINATION: ICD-10-CM

## 2022-08-29 DIAGNOSIS — G54.0 BRACHIAL PLEXUS DISORDERS: ICD-10-CM

## 2022-08-29 LAB
A1C WITH ESTIMATED AVERAGE GLUCOSE RESULT: 5 % — SIGNIFICANT CHANGE UP (ref 4–5.6)
ANION GAP SERPL CALC-SCNC: 12 MMOL/L — SIGNIFICANT CHANGE UP (ref 5–17)
APTT BLD: 39.7 SEC — HIGH (ref 27.5–35.5)
BASOPHILS # BLD AUTO: 0.07 K/UL — SIGNIFICANT CHANGE UP (ref 0–0.2)
BASOPHILS NFR BLD AUTO: 0.9 % — SIGNIFICANT CHANGE UP (ref 0–2)
BLD GP AB SCN SERPL QL: SIGNIFICANT CHANGE UP
BUN SERPL-MCNC: 16.3 MG/DL — SIGNIFICANT CHANGE UP (ref 8–20)
CALCIUM SERPL-MCNC: 9.8 MG/DL — SIGNIFICANT CHANGE UP (ref 8.4–10.5)
CHLORIDE SERPL-SCNC: 100 MMOL/L — SIGNIFICANT CHANGE UP (ref 98–107)
CO2 SERPL-SCNC: 27 MMOL/L — SIGNIFICANT CHANGE UP (ref 22–29)
CREAT SERPL-MCNC: 0.48 MG/DL — LOW (ref 0.5–1.3)
EGFR: 127 ML/MIN/1.73M2 — SIGNIFICANT CHANGE UP
EOSINOPHIL # BLD AUTO: 0.1 K/UL — SIGNIFICANT CHANGE UP (ref 0–0.5)
EOSINOPHIL NFR BLD AUTO: 1.2 % — SIGNIFICANT CHANGE UP (ref 0–6)
ESTIMATED AVERAGE GLUCOSE: 97 MG/DL — SIGNIFICANT CHANGE UP (ref 68–114)
GLUCOSE SERPL-MCNC: 88 MG/DL — SIGNIFICANT CHANGE UP (ref 70–99)
HCG SERPL-ACNC: <4 MIU/ML — SIGNIFICANT CHANGE UP
HCT VFR BLD CALC: 42.3 % — SIGNIFICANT CHANGE UP (ref 34.5–45)
HGB BLD-MCNC: 14.2 G/DL — SIGNIFICANT CHANGE UP (ref 11.5–15.5)
IMM GRANULOCYTES NFR BLD AUTO: 0.4 % — SIGNIFICANT CHANGE UP (ref 0–1.5)
INR BLD: 1.94 RATIO — HIGH (ref 0.88–1.16)
LYMPHOCYTES # BLD AUTO: 1.8 K/UL — SIGNIFICANT CHANGE UP (ref 1–3.3)
LYMPHOCYTES # BLD AUTO: 22.1 % — SIGNIFICANT CHANGE UP (ref 13–44)
MCHC RBC-ENTMCNC: 30.4 PG — SIGNIFICANT CHANGE UP (ref 27–34)
MCHC RBC-ENTMCNC: 33.6 GM/DL — SIGNIFICANT CHANGE UP (ref 32–36)
MCV RBC AUTO: 90.6 FL — SIGNIFICANT CHANGE UP (ref 80–100)
MONOCYTES # BLD AUTO: 0.58 K/UL — SIGNIFICANT CHANGE UP (ref 0–0.9)
MONOCYTES NFR BLD AUTO: 7.1 % — SIGNIFICANT CHANGE UP (ref 2–14)
NEUTROPHILS # BLD AUTO: 5.57 K/UL — SIGNIFICANT CHANGE UP (ref 1.8–7.4)
NEUTROPHILS NFR BLD AUTO: 68.3 % — SIGNIFICANT CHANGE UP (ref 43–77)
PLATELET # BLD AUTO: 303 K/UL — SIGNIFICANT CHANGE UP (ref 150–400)
POTASSIUM SERPL-MCNC: 4.9 MMOL/L — SIGNIFICANT CHANGE UP (ref 3.5–5.3)
POTASSIUM SERPL-SCNC: 4.9 MMOL/L — SIGNIFICANT CHANGE UP (ref 3.5–5.3)
PROTHROM AB SERPL-ACNC: 22.7 SEC — HIGH (ref 10.5–13.4)
RBC # BLD: 4.67 M/UL — SIGNIFICANT CHANGE UP (ref 3.8–5.2)
RBC # FLD: 13 % — SIGNIFICANT CHANGE UP (ref 10.3–14.5)
SODIUM SERPL-SCNC: 139 MMOL/L — SIGNIFICANT CHANGE UP (ref 135–145)
WBC # BLD: 8.15 K/UL — SIGNIFICANT CHANGE UP (ref 3.8–10.5)
WBC # FLD AUTO: 8.15 K/UL — SIGNIFICANT CHANGE UP (ref 3.8–10.5)

## 2022-08-29 PROCEDURE — G0463: CPT

## 2022-08-29 PROCEDURE — 93010 ELECTROCARDIOGRAM REPORT: CPT

## 2022-08-29 PROCEDURE — 93005 ELECTROCARDIOGRAM TRACING: CPT

## 2022-08-29 RX ORDER — CEFAZOLIN SODIUM 1 G
2000 VIAL (EA) INJECTION ONCE
Refills: 0 | Status: COMPLETED | OUTPATIENT
Start: 2022-09-09 | End: 2022-09-09

## 2022-08-29 NOTE — H&P PST ADULT - PROBLEM SELECTOR PLAN 1
medical clearance pending  Patient is scheduled for flex bronchoscopy, robotic assisted right first rib resection on 9/9/2022.  As per Dr. Peoples office advised to stop Xarelto 4 days prior to procedure.

## 2022-08-29 NOTE — H&P PST ADULT - NEGATIVE NEUROLOGICAL SYMPTOMS
no transient paralysis/no paresthesias/no generalized seizures/no focal seizures/no vertigo/no confusion

## 2022-08-29 NOTE — H&P PST ADULT - HISTORY OF PRESENT ILLNESS
33 y/o female     Patient is scheduled for flex bronchoscopy, robotic assisted right first rib resection on 9/9/2022.  33 y/o female peptic ulcers and covid-19 virus (7/25/22-8/3/22) presents to [facility] with complaints of having been found to have thoracic outlet syndrome. States after having covid-19 virus in August 2022 she noticed swelling in her right arm. She was seen at [facility], found to have chronic blood clotting in right subclavian vein. She had a thrombectomy done by vascular while in the hospital.     Patient is scheduled for flex bronchoscopy, robotic assisted right first rib resection on 9/9/2022.  31 y/o female peptic ulcers and covid-19 virus (7/25/22-8/3/22) presents to [facility] with complaints of having been found to have thoracic outlet syndrome. States after having covid-19 virus in August 2022 she noticed swelling in her right arm. She was seen at [facility], a venogram was performed on 8/10/22 which confirmed thoracic outlet syndrome. 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 She had a thrombectomy done by vascular while in the hospital to relieve the obstructed region. Denies numbness/tingling, pain, fevers or chills. Patient is scheduled for flex bronchoscopy, robotic assisted right first rib resection on 9/9/2022.  33 y/o female peptic ulcers and covid-19 virus (7/25/22-8/3/22) presents to [facility] with complaints of having been found to have thoracic outlet syndrome. States after having covid-19 virus in August 2022 she noticed swelling in her right arm. She was seen at [facility], a venogram was performed on 8/10/22 which confirmed thoracic outlet syndrome. 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 She had a thrombectomy done by vascular while in the hospital to relieve the obstructed region. Denies numbness/tingling, pain, fevers or chills. Patient is scheduled for flex bronchoscopy, robotic assisted right first rib resection on 9/9/2022 with Dr. Peoples.

## 2022-08-29 NOTE — H&P PST ADULT - ASSESSMENT
Patient educated on surgical scrub, COVID testing, preadmission instructions, medical clearance and day of procedure medications, verbalizes understanding. Pt instructed to stop vitamins/supplements/herbal medications/ASA/NSAIDS for one week prior to surgery and discuss with PMD.    CAPRINI SCORE    AGE RELATED RISK FACTORS                                                             [ ] Age 41-60 years                                            (1 Point)  [ ] Age: 61-74 years                                           (2 Points)                 [ ] Age= 75 years                                                (3 Points)             DISEASE RELATED RISK FACTORS                                                       [ ] Edema in the lower extremities                 (1 Point)                     [ ] Varicose veins                                               (1 Point)                                 [ ] BMI > 25 Kg/m2                                            (1 Point)                                  [ ] Serious infection (ie PNA)                            (1 Point)                     [ ] Lung disease ( COPD, Emphysema)            (1 Point)                                                                          [ ] Acute myocardial infarction                         (1 Point)                  [ ] Congestive heart failure (in the previous month)  (1 Point)         [ ] Inflammatory bowel disease                            (1 Point)                  [ ] Central venous access, PICC or Port               (2 points)       (within the last month)                                                                [ ] Stroke (in the previous month)                        (5 Points)    [ ] Previous or present malignancy                       (2 points)                                                                                                                                                         HEMATOLOGY RELATED FACTORS                                                         [ ] Prior episodes of VTE                                     (3 Points)                     [ ] Positive family history for VTE                      (3 Points)                  [ ] Prothrombin 98335 A                                     (3 Points)                     [ ] Factor V Leiden                                                (3 Points)                        [ ] Lupus anticoagulants                                      (3 Points)                                                           [ ] Anticardiolipin antibodies                              (3 Points)                                                       [ ] High homocysteine in the blood                   (3 Points)                                             [ ] Other congenital or acquired thrombophilia      (3 Points)                                                [ ] Heparin induced thrombocytopenia                  (3 Points)                                        MOBILITY RELATED FACTORS  [ ] Bed rest                                                         (1 Point)  [ ] Plaster cast                                                    (2 points)  [ ] Bed bound for more than 72 hours           (2 Points)    GENDER SPECIFIC FACTORS  [ ] Pregnancy or had a baby within the last month   (1 Point)  [ ] Post-partum < 6 weeks                                   (1 Point)  [ ] Hormonal therapy  or oral contraception   (1 Point)  [ ] History of pregnancy complications              (1 point)  [ ] Unexplained or recurrent              (1 Point)    OTHER RISK FACTORS                                           (1 Point)  [ ] BMI >40, smoking, diabetes requiring insulin, chemotherapy  blood transfusions and length of surgery over 2 hours    SURGERY RELATED RISK FACTORS  [ ]  Section within the last month     (1 Point)  [ ] Minor surgery                                                  (1 Point)  [ ] Arthroscopic surgery                                       (2 Points)  [ ] Planned major surgery lasting more            (2 Points)      than 45 minutes     [ ] Elective hip or knee joint replacement       (5 points)       surgery                                                TRAUMA RELATED RISK FACTORS  [ ] Fracture of the hip, pelvis, or leg                       (5 Points)  [ ] Spinal cord injury resulting in paralysis             (5 points)       (in the previous month)    [ ] Paralysis  (less than 1 month)                             (5 Points)  [ ] Multiple Trauma within 1 month                        (5 Points)    Total Score [        ]    Caprini Score 0-2: Low Risk, NO VTE prophylaxis required for most patients, encourage ambulation  Caprini Score 3-6: Moderate Risk , pharmacologic VTE prophylaxis is indicated for most patients (in the absence of contraindications)  Caprini Score Greater than or =7: High risk, pharmocologic VTE prophylaxis indicated for most patients (in the absence of contraindications)    OPIOID RISK TOOL    EDILBERTO EACH BOX THAT APPLIES AND ADD TOTALS AT THE END    FAMILY HISTORY OF SUBSTANCE ABUSE                 FEMALE         MALE                                                Alcohol                             [  ]1 pt          [  ]3pts                                               Illegal Durgs                     [  ]2 pts        [  ]3pts                                               Rx Drugs                           [  ]4 pts        [  ]4 pts    PERSONAL HISTORY OF SUBSTANCE ABUSE                                                                                          Alcohol                             [  ]3 pts       [  ]3 pts                                               Illegal Drugs                     [  ]4 pts        [  ]4 pts                                               Rx Drugs                           [  ]5 pts        [  ]5 pts    AGE BETWEEN 16-45 YEARS                                      [  ]1 pt         [  ]1 pt    HISTORY OF PREADOLESCENT   SEXUAL ABUSE                                                             [  ]3 pts        [  ]0pts    PSYCHOLOGICAL DISEASE                     ADD, OCD, Bipolar, Schizophrenia        [  ]2 pts         [  ]2 pts                      Depression                                               [  ]1 pt           [  ]1 pt           SCORING TOTAL   (add numbers and type here)              (***)                                     A score of 3 or lower indicated LOW risk for future opioid abuse  A score of 4 to 7 indicated moderate risk for future opioid abuse  A score of 8 or higher indicates a high risk for opioid abuse   31 y/o female peptic ulcers and covid-19 virus (22-8/3/22) presents to [facility] with complaints of having been found to have thoracic outlet syndrome. States after having covid-19 virus in 2022 she noticed swelling in her right arm. She was seen at [facility], a venogram was performed on 8/10/22 which confirmed thoracic outlet syndrome. 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 She had a thrombectomy done by vascular while in the hospital to relieve the obstructed region. Denies numbness/tingling, pain, fevers or chills. Patient is scheduled for flex bronchoscopy, robotic assisted right first rib resection on 2022. Patient educated on surgical scrub, COVID testing, preadmission instructions, medical clearance and day of procedure medications, verbalizes understanding. Pt instructed to stop vitamins/supplements/herbal medications/ASA/NSAIDS for one week prior to surgery and discuss with PMD. As per Dr. Peoples office advised to stop Xarelto 4 days prior to procedure.    CAPRINI SCORE    AGE RELATED RISK FACTORS                                                             [ ] Age 41-60 years                                            (1 Point)  [ ] Age: 61-74 years                                           (2 Points)                 [ ] Age= 75 years                                                (3 Points)             DISEASE RELATED RISK FACTORS                                                       [ ] Edema in the lower extremities                 (1 Point)                     [ ] Varicose veins                                               (1 Point)                                 [x ] BMI > 25 Kg/m2                                            (1 Point)                                  [ ] Serious infection (ie PNA)                            (1 Point)                     [ ] Lung disease ( COPD, Emphysema)            (1 Point)                                                                          [ ] Acute myocardial infarction                         (1 Point)                  [ ] Congestive heart failure (in the previous month)  (1 Point)         [ ] Inflammatory bowel disease                            (1 Point)                  [ ] Central venous access, PICC or Port               (2 points)       (within the last month)                                                                [ ] Stroke (in the previous month)                        (5 Points)    [ ] Previous or present malignancy                       (2 points)                                                                                                                                                         HEMATOLOGY RELATED FACTORS                                                         [x ] Prior episodes of VTE                                     (3 Points)                     [ ] Positive family history for VTE                      (3 Points)                  [ ] Prothrombin 73734 A                                     (3 Points)                     [ ] Factor V Leiden                                                (3 Points)                        [ ] Lupus anticoagulants                                      (3 Points)                                                           [ ] Anticardiolipin antibodies                              (3 Points)                                                       [ ] High homocysteine in the blood                   (3 Points)                                             [ ] Other congenital or acquired thrombophilia      (3 Points)                                                [ ] Heparin induced thrombocytopenia                  (3 Points)                                        MOBILITY RELATED FACTORS  [ ] Bed rest                                                         (1 Point)  [ ] Plaster cast                                                    (2 points)  [ ] Bed bound for more than 72 hours           (2 Points)    GENDER SPECIFIC FACTORS  [ ] Pregnancy or had a baby within the last month   (1 Point)  [ ] Post-partum < 6 weeks                                   (1 Point)  [ ] Hormonal therapy  or oral contraception   (1 Point)  [ ] History of pregnancy complications              (1 point)  [ ] Unexplained or recurrent              (1 Point)    OTHER RISK FACTORS                                           (1 Point)  [x ] BMI >40, smoking, diabetes requiring insulin, chemotherapy  blood transfusions and length of surgery over 2 hours    SURGERY RELATED RISK FACTORS  [ ]  Section within the last month     (1 Point)  [ ] Minor surgery                                                  (1 Point)  [ ] Arthroscopic surgery                                       (2 Points)  [ x] Planned major surgery lasting more            (2 Points)      than 45 minutes     [ ] Elective hip or knee joint replacement       (5 points)       surgery                                                TRAUMA RELATED RISK FACTORS  [ ] Fracture of the hip, pelvis, or leg                       (5 Points)  [ ] Spinal cord injury resulting in paralysis             (5 points)       (in the previous month)    [ ] Paralysis  (less than 1 month)                             (5 Points)  [ ] Multiple Trauma within 1 month                        (5 Points)    Total Score [     7   ]    Caprini Score 0-2: Low Risk, NO VTE prophylaxis required for most patients, encourage ambulation  Caprini Score 3-6: Moderate Risk , pharmacologic VTE prophylaxis is indicated for most patients (in the absence of contraindications)  Caprini Score Greater than or =7: High risk, pharmocologic VTE prophylaxis indicated for most patients (in the absence of contraindications)    OPIOID RISK TOOL    EDILBERTO EACH BOX THAT APPLIES AND ADD TOTALS AT THE END    FAMILY HISTORY OF SUBSTANCE ABUSE                 FEMALE         MALE                                                Alcohol                             [  ]1 pt          [  ]3pts                                               Illegal Durgs                     [  ]2 pts        [  ]3pts                                               Rx Drugs                           [  ]4 pts        [  ]4 pts    PERSONAL HISTORY OF SUBSTANCE ABUSE                                                                                          Alcohol                             [  ]3 pts       [  ]3 pts                                               Illegal Drugs                     [  ]4 pts        [  ]4 pts                                               Rx Drugs                           [  ]5 pts        [  ]5 pts    AGE BETWEEN 16-45 YEARS                                      [  x]1 pt         [  ]1 pt    HISTORY OF PREADOLESCENT   SEXUAL ABUSE                                                             [  ]3 pts        [  ]0pts    PSYCHOLOGICAL DISEASE                     ADD, OCD, Bipolar, Schizophrenia        [  ]2 pts         [  ]2 pts                      Depression                                               [  ]1 pt           [  ]1 pt           SCORING TOTAL   (add numbers and type here)              (*1**)                                     A score of 3 or lower indicated LOW risk for future opioid abuse  A score of 4 to 7 indicated moderate risk for future opioid abuse  A score of 8 or higher indicates a high risk for opioid abuse

## 2022-08-29 NOTE — H&P PST ADULT - CARDIOVASCULAR
negative normal/regular rate and rhythm/S1 S2 present/no gallops/no rub/no murmur/no JVD/no pedal edema/vascular

## 2022-08-29 NOTE — H&P PST ADULT - VTE RISK COMMENTS
chronic clotting in right subclavian chronic clotting in right subclavian, S/P Thromboendarterectomy

## 2022-08-29 NOTE — H&P PST ADULT - NSICDXPASTMEDICALHX_GEN_ALL_CORE_FT
PAST MEDICAL HISTORY:  Thoracic outlet syndrome      PAST MEDICAL HISTORY:  Gastric peptic ulcer hx of    Thoracic outlet syndrome      PAST MEDICAL HISTORY:  Gastric peptic ulcer hx of    Right subclavian vein thrombosis     Thoracic outlet syndrome

## 2022-08-29 NOTE — H&P PST ADULT - NSICDXFAMILYHX_GEN_ALL_CORE_FT
FAMILY HISTORY:  Sibling  Still living? No  FH: cerebral aneurysm, Age at diagnosis: Age Unknown

## 2022-08-29 NOTE — H&P PST ADULT - NSICDXPASTSURGICALHX_GEN_ALL_CORE_FT
PAST SURGICAL HISTORY:  No significant past surgical history      PAST SURGICAL HISTORY:  Arm fracture, right repair 1999

## 2022-09-01 ENCOUNTER — RESULT REVIEW (OUTPATIENT)
Age: 32
End: 2022-09-01

## 2022-09-01 ENCOUNTER — APPOINTMENT (OUTPATIENT)
Dept: MRI IMAGING | Facility: CLINIC | Age: 32
End: 2022-09-01

## 2022-09-01 ENCOUNTER — OUTPATIENT (OUTPATIENT)
Dept: OUTPATIENT SERVICES | Facility: HOSPITAL | Age: 32
LOS: 1 days | End: 2022-09-01
Payer: COMMERCIAL

## 2022-09-01 DIAGNOSIS — G54.0 BRACHIAL PLEXUS DISORDERS: ICD-10-CM

## 2022-09-01 DIAGNOSIS — S42.301A UNSPECIFIED FRACTURE OF SHAFT OF HUMERUS, RIGHT ARM, INITIAL ENCOUNTER FOR CLOSED FRACTURE: Chronic | ICD-10-CM

## 2022-09-01 PROBLEM — K25.9 GASTRIC ULCER, UNSPECIFIED AS ACUTE OR CHRONIC, WITHOUT HEMORRHAGE OR PERFORATION: Chronic | Status: ACTIVE | Noted: 2022-08-29

## 2022-09-01 PROBLEM — I82.B11: Chronic | Status: ACTIVE | Noted: 2022-08-29

## 2022-09-01 PROCEDURE — C8909: CPT

## 2022-09-01 PROCEDURE — 71555 MRI ANGIO CHEST W OR W/O DYE: CPT | Mod: 26

## 2022-09-01 PROCEDURE — A9585: CPT

## 2022-09-01 RX ORDER — RIVAROXABAN 15 MG-20MG
1 KIT ORAL
Qty: 30 | Refills: 0
Start: 2022-09-01 | End: 2022-09-30

## 2022-09-08 ENCOUNTER — TRANSCRIPTION ENCOUNTER (OUTPATIENT)
Age: 32
End: 2022-09-08

## 2022-09-09 ENCOUNTER — RESULT REVIEW (OUTPATIENT)
Age: 32
End: 2022-09-09

## 2022-09-09 ENCOUNTER — APPOINTMENT (OUTPATIENT)
Dept: THORACIC SURGERY | Facility: HOSPITAL | Age: 32
End: 2022-09-09

## 2022-09-09 ENCOUNTER — INPATIENT (INPATIENT)
Facility: HOSPITAL | Age: 32
LOS: 1 days | Discharge: HOME | DRG: 42 | End: 2022-09-11
Attending: THORACIC SURGERY (CARDIOTHORACIC VASCULAR SURGERY) | Admitting: THORACIC SURGERY (CARDIOTHORACIC VASCULAR SURGERY)
Payer: COMMERCIAL

## 2022-09-09 VITALS
HEART RATE: 100 BPM | HEIGHT: 63 IN | OXYGEN SATURATION: 100 % | TEMPERATURE: 98 F | RESPIRATION RATE: 15 BRPM | WEIGHT: 151.9 LBS | DIASTOLIC BLOOD PRESSURE: 76 MMHG | SYSTOLIC BLOOD PRESSURE: 128 MMHG

## 2022-09-09 DIAGNOSIS — G54.0 BRACHIAL PLEXUS DISORDERS: ICD-10-CM

## 2022-09-09 DIAGNOSIS — S42.301A UNSPECIFIED FRACTURE OF SHAFT OF HUMERUS, RIGHT ARM, INITIAL ENCOUNTER FOR CLOSED FRACTURE: Chronic | ICD-10-CM

## 2022-09-09 LAB
ANION GAP SERPL CALC-SCNC: 14 MMOL/L — SIGNIFICANT CHANGE UP (ref 5–17)
APTT BLD: 35.1 SEC — SIGNIFICANT CHANGE UP (ref 27.5–35.5)
BASOPHILS # BLD AUTO: 0.07 K/UL — SIGNIFICANT CHANGE UP (ref 0–0.2)
BASOPHILS NFR BLD AUTO: 0.5 % — SIGNIFICANT CHANGE UP (ref 0–2)
BUN SERPL-MCNC: 12.7 MG/DL — SIGNIFICANT CHANGE UP (ref 8–20)
CALCIUM SERPL-MCNC: 8.1 MG/DL — LOW (ref 8.4–10.5)
CHLORIDE SERPL-SCNC: 104 MMOL/L — SIGNIFICANT CHANGE UP (ref 98–107)
CO2 SERPL-SCNC: 20 MMOL/L — LOW (ref 22–29)
CREAT SERPL-MCNC: 0.64 MG/DL — SIGNIFICANT CHANGE UP (ref 0.5–1.3)
EGFR: 119 ML/MIN/1.73M2 — SIGNIFICANT CHANGE UP
EOSINOPHIL # BLD AUTO: 0 K/UL — SIGNIFICANT CHANGE UP (ref 0–0.5)
EOSINOPHIL NFR BLD AUTO: 0 % — SIGNIFICANT CHANGE UP (ref 0–6)
GLUCOSE BLDC GLUCOMTR-MCNC: 145 MG/DL — HIGH (ref 70–99)
GLUCOSE SERPL-MCNC: 121 MG/DL — HIGH (ref 70–99)
HCT VFR BLD CALC: 41.2 % — SIGNIFICANT CHANGE UP (ref 34.5–45)
HGB BLD-MCNC: 13.8 G/DL — SIGNIFICANT CHANGE UP (ref 11.5–15.5)
IMM GRANULOCYTES NFR BLD AUTO: 0.4 % — SIGNIFICANT CHANGE UP (ref 0–1.5)
INR BLD: 1.06 RATIO — SIGNIFICANT CHANGE UP (ref 0.88–1.16)
LYMPHOCYTES # BLD AUTO: 1.34 K/UL — SIGNIFICANT CHANGE UP (ref 1–3.3)
LYMPHOCYTES # BLD AUTO: 10.3 % — LOW (ref 13–44)
MCHC RBC-ENTMCNC: 29.7 PG — SIGNIFICANT CHANGE UP (ref 27–34)
MCHC RBC-ENTMCNC: 33.5 GM/DL — SIGNIFICANT CHANGE UP (ref 32–36)
MCV RBC AUTO: 88.8 FL — SIGNIFICANT CHANGE UP (ref 80–100)
MONOCYTES # BLD AUTO: 0.22 K/UL — SIGNIFICANT CHANGE UP (ref 0–0.9)
MONOCYTES NFR BLD AUTO: 1.7 % — LOW (ref 2–14)
NEUTROPHILS # BLD AUTO: 11.28 K/UL — HIGH (ref 1.8–7.4)
NEUTROPHILS NFR BLD AUTO: 87.1 % — HIGH (ref 43–77)
PLATELET # BLD AUTO: 330 K/UL — SIGNIFICANT CHANGE UP (ref 150–400)
POTASSIUM SERPL-MCNC: 4.2 MMOL/L — SIGNIFICANT CHANGE UP (ref 3.5–5.3)
POTASSIUM SERPL-SCNC: 4.2 MMOL/L — SIGNIFICANT CHANGE UP (ref 3.5–5.3)
PROTHROM AB SERPL-ACNC: 12.3 SEC — SIGNIFICANT CHANGE UP (ref 10.5–13.4)
RBC # BLD: 4.64 M/UL — SIGNIFICANT CHANGE UP (ref 3.8–5.2)
RBC # FLD: 12.1 % — SIGNIFICANT CHANGE UP (ref 10.3–14.5)
SODIUM SERPL-SCNC: 138 MMOL/L — SIGNIFICANT CHANGE UP (ref 135–145)
WBC # BLD: 12.96 K/UL — HIGH (ref 3.8–10.5)
WBC # FLD AUTO: 12.96 K/UL — HIGH (ref 3.8–10.5)

## 2022-09-09 PROCEDURE — 21600 PARTIAL REMOVAL OF RIB: CPT | Mod: AS

## 2022-09-09 PROCEDURE — 88311 DECALCIFY TISSUE: CPT | Mod: 26

## 2022-09-09 PROCEDURE — 71045 X-RAY EXAM CHEST 1 VIEW: CPT | Mod: 26

## 2022-09-09 PROCEDURE — S2900 ROBOTIC SURGICAL SYSTEM: CPT | Mod: NC

## 2022-09-09 PROCEDURE — 88302 TISSUE EXAM BY PATHOLOGIST: CPT | Mod: 26

## 2022-09-09 PROCEDURE — 21600 PARTIAL REMOVAL OF RIB: CPT

## 2022-09-09 PROCEDURE — 31622 DX BRONCHOSCOPE/WASH: CPT

## 2022-09-09 DEVICE — CATH THOR RT ANG 28FR: Type: IMPLANTABLE DEVICE | Status: FUNCTIONAL

## 2022-09-09 DEVICE — XI STAPLER RELOAD 30MM GREEN: Type: IMPLANTABLE DEVICE | Status: FUNCTIONAL

## 2022-09-09 DEVICE — XI STAPLER RELOAD 45MM GREEN: Type: IMPLANTABLE DEVICE | Status: FUNCTIONAL

## 2022-09-09 DEVICE — CATH THORACIC CHEST 24FR X 20: Type: IMPLANTABLE DEVICE | Status: FUNCTIONAL

## 2022-09-09 DEVICE — STAPLER COVIDIEN TRI-STAPLE 45MM TAN RELOAD: Type: IMPLANTABLE DEVICE | Status: FUNCTIONAL

## 2022-09-09 DEVICE — XI STAPLER RELOAD 45MM WHITE: Type: IMPLANTABLE DEVICE | Status: FUNCTIONAL

## 2022-09-09 DEVICE — SPONGE HSTAT SURGICEL 2X14": Type: IMPLANTABLE DEVICE | Status: FUNCTIONAL

## 2022-09-09 DEVICE — PROBE CRYOABLATION SPHERE 11CM: Type: IMPLANTABLE DEVICE | Status: FUNCTIONAL

## 2022-09-09 DEVICE — XI STAPLER RELOAD 45MM BLUE: Type: IMPLANTABLE DEVICE | Status: FUNCTIONAL

## 2022-09-09 DEVICE — XI STAPLER RELOAD 30MM BLUE: Type: IMPLANTABLE DEVICE | Status: FUNCTIONAL

## 2022-09-09 DEVICE — XI STAPLER RELOAD 30MM WHITE: Type: IMPLANTABLE DEVICE | Status: FUNCTIONAL

## 2022-09-09 RX ORDER — SENNA PLUS 8.6 MG/1
1 TABLET ORAL AT BEDTIME
Refills: 0 | Status: DISCONTINUED | OUTPATIENT
Start: 2022-09-09 | End: 2022-09-11

## 2022-09-09 RX ORDER — HEPARIN SODIUM 5000 [USP'U]/ML
5000 INJECTION INTRAVENOUS; SUBCUTANEOUS EVERY 8 HOURS
Refills: 0 | Status: DISCONTINUED | OUTPATIENT
Start: 2022-09-10 | End: 2022-09-11

## 2022-09-09 RX ORDER — ONDANSETRON 8 MG/1
4 TABLET, FILM COATED ORAL ONCE
Refills: 0 | Status: DISCONTINUED | OUTPATIENT
Start: 2022-09-09 | End: 2022-09-09

## 2022-09-09 RX ORDER — AMITRIPTYLINE HCL 25 MG
10 TABLET ORAL AT BEDTIME
Refills: 0 | Status: DISCONTINUED | OUTPATIENT
Start: 2022-09-09 | End: 2022-09-11

## 2022-09-09 RX ORDER — HYDROMORPHONE HYDROCHLORIDE 2 MG/ML
0.5 INJECTION INTRAMUSCULAR; INTRAVENOUS; SUBCUTANEOUS
Refills: 0 | Status: DISCONTINUED | OUTPATIENT
Start: 2022-09-09 | End: 2022-09-09

## 2022-09-09 RX ORDER — ACETAMINOPHEN 500 MG
1000 TABLET ORAL ONCE
Refills: 0 | Status: COMPLETED | OUTPATIENT
Start: 2022-09-09 | End: 2022-09-09

## 2022-09-09 RX ORDER — SODIUM CHLORIDE 9 MG/ML
1000 INJECTION, SOLUTION INTRAVENOUS
Refills: 0 | Status: DISCONTINUED | OUTPATIENT
Start: 2022-09-09 | End: 2022-09-09

## 2022-09-09 RX ORDER — MORPHINE SULFATE 50 MG/1
3 CAPSULE, EXTENDED RELEASE ORAL EVERY 4 HOURS
Refills: 0 | Status: DISCONTINUED | OUTPATIENT
Start: 2022-09-09 | End: 2022-09-10

## 2022-09-09 RX ORDER — ACETAMINOPHEN 500 MG
975 TABLET ORAL ONCE
Refills: 0 | Status: DISCONTINUED | OUTPATIENT
Start: 2022-09-09 | End: 2022-09-09

## 2022-09-09 RX ORDER — PANTOPRAZOLE SODIUM 20 MG/1
40 TABLET, DELAYED RELEASE ORAL
Refills: 0 | Status: DISCONTINUED | OUTPATIENT
Start: 2022-09-09 | End: 2022-09-11

## 2022-09-09 RX ORDER — SODIUM CHLORIDE 9 MG/ML
3 INJECTION INTRAMUSCULAR; INTRAVENOUS; SUBCUTANEOUS EVERY 8 HOURS
Refills: 0 | Status: DISCONTINUED | OUTPATIENT
Start: 2022-09-09 | End: 2022-09-09

## 2022-09-09 RX ORDER — ACETAMINOPHEN 500 MG
975 TABLET ORAL ONCE
Refills: 0 | Status: COMPLETED | OUTPATIENT
Start: 2022-09-09 | End: 2022-09-09

## 2022-09-09 RX ORDER — OXYCODONE HYDROCHLORIDE 5 MG/1
5 TABLET ORAL EVERY 4 HOURS
Refills: 0 | Status: DISCONTINUED | OUTPATIENT
Start: 2022-09-09 | End: 2022-09-11

## 2022-09-09 RX ORDER — GABAPENTIN 400 MG/1
300 CAPSULE ORAL ONCE
Refills: 0 | Status: DISCONTINUED | OUTPATIENT
Start: 2022-09-09 | End: 2022-09-09

## 2022-09-09 RX ADMIN — MORPHINE SULFATE 3 MILLIGRAM(S): 50 CAPSULE, EXTENDED RELEASE ORAL at 17:47

## 2022-09-09 RX ADMIN — Medication 400 MILLIGRAM(S): at 15:32

## 2022-09-09 RX ADMIN — HYDROMORPHONE HYDROCHLORIDE 0.5 MILLIGRAM(S): 2 INJECTION INTRAMUSCULAR; INTRAVENOUS; SUBCUTANEOUS at 14:45

## 2022-09-09 RX ADMIN — Medication 10 MILLIGRAM(S): at 22:05

## 2022-09-09 RX ADMIN — Medication 975 MILLIGRAM(S): at 20:51

## 2022-09-09 RX ADMIN — HYDROMORPHONE HYDROCHLORIDE 0.5 MILLIGRAM(S): 2 INJECTION INTRAMUSCULAR; INTRAVENOUS; SUBCUTANEOUS at 14:50

## 2022-09-09 RX ADMIN — OXYCODONE HYDROCHLORIDE 5 MILLIGRAM(S): 5 TABLET ORAL at 21:40

## 2022-09-09 RX ADMIN — SENNA PLUS 1 TABLET(S): 8.6 TABLET ORAL at 22:04

## 2022-09-09 RX ADMIN — Medication 100 MILLIGRAM(S): at 11:30

## 2022-09-09 RX ADMIN — HYDROMORPHONE HYDROCHLORIDE 0.5 MILLIGRAM(S): 2 INJECTION INTRAMUSCULAR; INTRAVENOUS; SUBCUTANEOUS at 15:15

## 2022-09-09 RX ADMIN — MORPHINE SULFATE 3 MILLIGRAM(S): 50 CAPSULE, EXTENDED RELEASE ORAL at 17:32

## 2022-09-09 RX ADMIN — HYDROMORPHONE HYDROCHLORIDE 0.5 MILLIGRAM(S): 2 INJECTION INTRAMUSCULAR; INTRAVENOUS; SUBCUTANEOUS at 14:20

## 2022-09-09 RX ADMIN — MORPHINE SULFATE 3 MILLIGRAM(S): 50 CAPSULE, EXTENDED RELEASE ORAL at 22:15

## 2022-09-09 RX ADMIN — HYDROMORPHONE HYDROCHLORIDE 0.5 MILLIGRAM(S): 2 INJECTION INTRAMUSCULAR; INTRAVENOUS; SUBCUTANEOUS at 15:45

## 2022-09-09 RX ADMIN — Medication 975 MILLIGRAM(S): at 22:40

## 2022-09-09 RX ADMIN — MORPHINE SULFATE 3 MILLIGRAM(S): 50 CAPSULE, EXTENDED RELEASE ORAL at 22:00

## 2022-09-09 RX ADMIN — MORPHINE SULFATE 3 MILLIGRAM(S): 50 CAPSULE, EXTENDED RELEASE ORAL at 03:30

## 2022-09-09 RX ADMIN — Medication 1000 MILLIGRAM(S): at 16:00

## 2022-09-09 RX ADMIN — OXYCODONE HYDROCHLORIDE 5 MILLIGRAM(S): 5 TABLET ORAL at 20:51

## 2022-09-09 NOTE — BRIEF OPERATIVE NOTE - NSICDXBRIEFPROCEDURE_GEN_ALL_CORE_FT
PROCEDURES:  First rib resection 09-Sep-2022 14:42:59 Robotic assisted Right VATS First rib resection Geraldine Borden

## 2022-09-10 ENCOUNTER — TRANSCRIPTION ENCOUNTER (OUTPATIENT)
Age: 32
End: 2022-09-10

## 2022-09-10 LAB
ANION GAP SERPL CALC-SCNC: 13 MMOL/L — SIGNIFICANT CHANGE UP (ref 5–17)
BASOPHILS # BLD AUTO: 0.02 K/UL — SIGNIFICANT CHANGE UP (ref 0–0.2)
BASOPHILS NFR BLD AUTO: 0.1 % — SIGNIFICANT CHANGE UP (ref 0–2)
BUN SERPL-MCNC: 10.5 MG/DL — SIGNIFICANT CHANGE UP (ref 8–20)
CALCIUM SERPL-MCNC: 8.9 MG/DL — SIGNIFICANT CHANGE UP (ref 8.4–10.5)
CHLORIDE SERPL-SCNC: 99 MMOL/L — SIGNIFICANT CHANGE UP (ref 98–107)
CO2 SERPL-SCNC: 24 MMOL/L — SIGNIFICANT CHANGE UP (ref 22–29)
CREAT SERPL-MCNC: 0.5 MG/DL — SIGNIFICANT CHANGE UP (ref 0.5–1.3)
EGFR: 126 ML/MIN/1.73M2 — SIGNIFICANT CHANGE UP
EOSINOPHIL # BLD AUTO: 0 K/UL — SIGNIFICANT CHANGE UP (ref 0–0.5)
EOSINOPHIL NFR BLD AUTO: 0 % — SIGNIFICANT CHANGE UP (ref 0–6)
GLUCOSE SERPL-MCNC: 111 MG/DL — HIGH (ref 70–99)
HCG UR QL: NEGATIVE — SIGNIFICANT CHANGE UP
HCT VFR BLD CALC: 40.4 % — SIGNIFICANT CHANGE UP (ref 34.5–45)
HGB BLD-MCNC: 14.1 G/DL — SIGNIFICANT CHANGE UP (ref 11.5–15.5)
IMM GRANULOCYTES NFR BLD AUTO: 0.6 % — SIGNIFICANT CHANGE UP (ref 0–1.5)
LYMPHOCYTES # BLD AUTO: 1.33 K/UL — SIGNIFICANT CHANGE UP (ref 1–3.3)
LYMPHOCYTES # BLD AUTO: 7 % — LOW (ref 13–44)
MAGNESIUM SERPL-MCNC: 1.8 MG/DL — SIGNIFICANT CHANGE UP (ref 1.6–2.6)
MCHC RBC-ENTMCNC: 30.7 PG — SIGNIFICANT CHANGE UP (ref 27–34)
MCHC RBC-ENTMCNC: 34.9 GM/DL — SIGNIFICANT CHANGE UP (ref 32–36)
MCV RBC AUTO: 88 FL — SIGNIFICANT CHANGE UP (ref 80–100)
MONOCYTES # BLD AUTO: 1.12 K/UL — HIGH (ref 0–0.9)
MONOCYTES NFR BLD AUTO: 5.9 % — SIGNIFICANT CHANGE UP (ref 2–14)
NEUTROPHILS # BLD AUTO: 16.49 K/UL — HIGH (ref 1.8–7.4)
NEUTROPHILS NFR BLD AUTO: 86.4 % — HIGH (ref 43–77)
PLATELET # BLD AUTO: 373 K/UL — SIGNIFICANT CHANGE UP (ref 150–400)
POTASSIUM SERPL-MCNC: 4.4 MMOL/L — SIGNIFICANT CHANGE UP (ref 3.5–5.3)
POTASSIUM SERPL-SCNC: 4.4 MMOL/L — SIGNIFICANT CHANGE UP (ref 3.5–5.3)
RBC # BLD: 4.59 M/UL — SIGNIFICANT CHANGE UP (ref 3.8–5.2)
RBC # FLD: 12.3 % — SIGNIFICANT CHANGE UP (ref 10.3–14.5)
SODIUM SERPL-SCNC: 136 MMOL/L — SIGNIFICANT CHANGE UP (ref 135–145)
WBC # BLD: 19.08 K/UL — HIGH (ref 3.8–10.5)
WBC # FLD AUTO: 19.08 K/UL — HIGH (ref 3.8–10.5)

## 2022-09-10 PROCEDURE — 71045 X-RAY EXAM CHEST 1 VIEW: CPT | Mod: 26

## 2022-09-10 RX ORDER — ACETAMINOPHEN 500 MG
975 TABLET ORAL EVERY 6 HOURS
Refills: 0 | Status: DISCONTINUED | OUTPATIENT
Start: 2022-09-10 | End: 2022-09-11

## 2022-09-10 RX ORDER — OXYCODONE HYDROCHLORIDE 5 MG/1
5 TABLET ORAL ONCE
Refills: 0 | Status: DISCONTINUED | OUTPATIENT
Start: 2022-09-10 | End: 2022-09-11

## 2022-09-10 RX ORDER — METHOCARBAMOL 500 MG/1
750 TABLET, FILM COATED ORAL THREE TIMES A DAY
Refills: 0 | Status: DISCONTINUED | OUTPATIENT
Start: 2022-09-10 | End: 2022-09-11

## 2022-09-10 RX ORDER — OXYCODONE HYDROCHLORIDE 5 MG/1
10 TABLET ORAL EVERY 4 HOURS
Refills: 0 | Status: DISCONTINUED | OUTPATIENT
Start: 2022-09-10 | End: 2022-09-11

## 2022-09-10 RX ORDER — LIDOCAINE 4 G/100G
1 CREAM TOPICAL DAILY
Refills: 0 | Status: DISCONTINUED | OUTPATIENT
Start: 2022-09-10 | End: 2022-09-11

## 2022-09-10 RX ORDER — METHOCARBAMOL 500 MG/1
750 TABLET, FILM COATED ORAL ONCE
Refills: 0 | Status: COMPLETED | OUTPATIENT
Start: 2022-09-10 | End: 2022-09-10

## 2022-09-10 RX ADMIN — OXYCODONE HYDROCHLORIDE 5 MILLIGRAM(S): 5 TABLET ORAL at 07:05

## 2022-09-10 RX ADMIN — SENNA PLUS 1 TABLET(S): 8.6 TABLET ORAL at 21:11

## 2022-09-10 RX ADMIN — OXYCODONE HYDROCHLORIDE 5 MILLIGRAM(S): 5 TABLET ORAL at 10:51

## 2022-09-10 RX ADMIN — HEPARIN SODIUM 5000 UNIT(S): 5000 INJECTION INTRAVENOUS; SUBCUTANEOUS at 23:41

## 2022-09-10 RX ADMIN — MORPHINE SULFATE 3 MILLIGRAM(S): 50 CAPSULE, EXTENDED RELEASE ORAL at 08:47

## 2022-09-10 RX ADMIN — OXYCODONE HYDROCHLORIDE 5 MILLIGRAM(S): 5 TABLET ORAL at 15:45

## 2022-09-10 RX ADMIN — METHOCARBAMOL 750 MILLIGRAM(S): 500 TABLET, FILM COATED ORAL at 17:10

## 2022-09-10 RX ADMIN — PANTOPRAZOLE SODIUM 40 MILLIGRAM(S): 20 TABLET, DELAYED RELEASE ORAL at 06:41

## 2022-09-10 RX ADMIN — MORPHINE SULFATE 3 MILLIGRAM(S): 50 CAPSULE, EXTENDED RELEASE ORAL at 03:14

## 2022-09-10 RX ADMIN — OXYCODONE HYDROCHLORIDE 5 MILLIGRAM(S): 5 TABLET ORAL at 20:10

## 2022-09-10 RX ADMIN — OXYCODONE HYDROCHLORIDE 5 MILLIGRAM(S): 5 TABLET ORAL at 11:20

## 2022-09-10 RX ADMIN — METHOCARBAMOL 750 MILLIGRAM(S): 500 TABLET, FILM COATED ORAL at 22:00

## 2022-09-10 RX ADMIN — OXYCODONE HYDROCHLORIDE 5 MILLIGRAM(S): 5 TABLET ORAL at 06:41

## 2022-09-10 RX ADMIN — HEPARIN SODIUM 5000 UNIT(S): 5000 INJECTION INTRAVENOUS; SUBCUTANEOUS at 15:02

## 2022-09-10 RX ADMIN — Medication 10 MILLIGRAM(S): at 21:12

## 2022-09-10 RX ADMIN — MORPHINE SULFATE 3 MILLIGRAM(S): 50 CAPSULE, EXTENDED RELEASE ORAL at 09:04

## 2022-09-10 RX ADMIN — Medication 975 MILLIGRAM(S): at 15:02

## 2022-09-10 RX ADMIN — OXYCODONE HYDROCHLORIDE 5 MILLIGRAM(S): 5 TABLET ORAL at 19:43

## 2022-09-10 RX ADMIN — OXYCODONE HYDROCHLORIDE 5 MILLIGRAM(S): 5 TABLET ORAL at 15:02

## 2022-09-10 RX ADMIN — Medication 975 MILLIGRAM(S): at 23:26

## 2022-09-10 RX ADMIN — HEPARIN SODIUM 5000 UNIT(S): 5000 INJECTION INTRAVENOUS; SUBCUTANEOUS at 08:51

## 2022-09-10 RX ADMIN — Medication 975 MILLIGRAM(S): at 15:45

## 2022-09-10 RX ADMIN — Medication 975 MILLIGRAM(S): at 01:00

## 2022-09-10 NOTE — PROGRESS NOTE ADULT - SUBJECTIVE AND OBJECTIVE BOX
Subjective - patient seen and evaluated bedside. Sitting in bed. Admits to pain at surgical site. Admits to pain with deep breathing. Denies CP, SOB, HA, dizziness, n/v/d    Review of Systems: negative x 10 systems except as noted above    Brief summary:  32yFemale POD# 1 RT Robotic VATS/1st rib resection        PAST MEDICAL & SURGICAL HISTORY:  Thoracic outlet syndrome      Gastric peptic ulcer  hx of      Right subclavian vein thrombosis      Arm fracture, right  repair 1999            amitriptyline 10 milliGRAM(s) Oral at bedtime  heparin   Injectable 5000 Unit(s) SubCutaneous every 8 hours  morphine  - Injectable 3 milliGRAM(s) IV Push every 4 hours PRN  oxyCODONE    IR 5 milliGRAM(s) Oral every 4 hours PRN  pantoprazole    Tablet 40 milliGRAM(s) Oral before breakfast  senna 1 Tablet(s) Oral at bedtime  MEDICATIONS  (PRN):  morphine  - Injectable 3 milliGRAM(s) IV Push every 4 hours PRN Severe Pain (7 - 10)  oxyCODONE    IR 5 milliGRAM(s) Oral every 4 hours PRN Moderate Pain (4 - 6)    Height (cm): 160 (09-09 @ 09:20)  Weight (kg): 68.9 (09-09 @ 09:20)  BMI (kg/m2): 26.9 (09-09 @ 09:20)  BSA (m2): 1.72 (09-09 @ 09:20)  Daily Height in cm: 160.02 (09 Sep 2022 09:20)    Daily                               13.8   12.96 )-----------( 330      ( 09 Sep 2022 14:43 )             41.2   09-09    138  |  104  |  12.7  ----------------------------<  121<H>  4.2   |  20.0<L>  |  0.64    Ca    8.1<L>      09 Sep 2022 14:43        PT/INR - ( 09 Sep 2022 09:45 )   PT: 12.3 sec;   INR: 1.06 ratio         PTT - ( 09 Sep 2022 09:45 )  PTT:35.1 sec      Objective:  T(C): 36.8 (09-09-22 @ 20:00), Max: 37.2 (09-09-22 @ 16:15)  HR: 101 (09-09-22 @ 22:00) (85 - 144)  BP: 130/82 (09-09-22 @ 22:00) (118/74 - 138/73)  RR: 24 (09-09-22 @ 22:00) (14 - 24)  SpO2: 99% (09-09-22 @ 22:00) (99% - 100%)  Wt(kg): --CAPILLARY BLOOD GLUCOSE      POCT Blood Glucose.: 145 mg/dL (09 Sep 2022 17:33)  I&O's Summary    09 Sep 2022 07:01  -  10 Sep 2022 02:36  --------------------------------------------------------  IN: 120 mL / OUT: 38 mL / NET: 82 mL        Physical Exam  General: NAD  Neuro: A+O x 3, non-focal, speech clear and intact  Psych: Appropriate affect  HEENT:  NCAT, No conjuctival edema or icterus, no thrush.  Pulm: CTA, equal bilaterally  CV: RRR,  +S1S2  Abd: soft, NT, ND, +BS  Ext: +DP Pulses b/l, no edema  Skin: Warm, dry, intact  Inc: Robotic incision sites c/d/i, no evidence of bleeding/infection/breakdown  Chest tubes: RT CT to WS, draining appropriately, no AL.         Imaging:    Postop CXR pending rad read, WNL, no evidence of pneumothorax.

## 2022-09-10 NOTE — PROGRESS NOTE ADULT - PROBLEM SELECTOR PLAN 1
S/p RT 1st rib resection.  CT in place to WS. Remove when output allows.  Monitor drainage. Daily CXR while in place.  Pain control with oxycodone/morphine.  SCD boots, HSQ for DVT PPX. Holding AC in immediate postop setting.   Encourage PO intake  Encourage OOB to chair and ambulation with PT  Encourage deep breathing exercised and coughing  Chest PT and IS use with bedside nurse   Possible downgrade to floor in AM.  Plan to be discussed with Dr. Peoples later this AM

## 2022-09-10 NOTE — PROGRESS NOTE ADULT - ASSESSMENT
32F with PMH thoracic outlet syndrome w/ prior RT subclavian thrombus s/p thrombectomy now s/p elective RT first rib resection. Postop course uncomplicated.

## 2022-09-11 ENCOUNTER — TRANSCRIPTION ENCOUNTER (OUTPATIENT)
Age: 32
End: 2022-09-11

## 2022-09-11 VITALS
RESPIRATION RATE: 19 BRPM | DIASTOLIC BLOOD PRESSURE: 73 MMHG | SYSTOLIC BLOOD PRESSURE: 126 MMHG | HEART RATE: 105 BPM | OXYGEN SATURATION: 96 %

## 2022-09-11 LAB
ANION GAP SERPL CALC-SCNC: 12 MMOL/L — SIGNIFICANT CHANGE UP (ref 5–17)
BUN SERPL-MCNC: 6.9 MG/DL — LOW (ref 8–20)
CALCIUM SERPL-MCNC: 8.8 MG/DL — SIGNIFICANT CHANGE UP (ref 8.4–10.5)
CHLORIDE SERPL-SCNC: 100 MMOL/L — SIGNIFICANT CHANGE UP (ref 98–107)
CO2 SERPL-SCNC: 26 MMOL/L — SIGNIFICANT CHANGE UP (ref 22–29)
CREAT SERPL-MCNC: 0.45 MG/DL — LOW (ref 0.5–1.3)
EGFR: 129 ML/MIN/1.73M2 — SIGNIFICANT CHANGE UP
GLUCOSE SERPL-MCNC: 111 MG/DL — HIGH (ref 70–99)
HCT VFR BLD CALC: 38.8 % — SIGNIFICANT CHANGE UP (ref 34.5–45)
HGB BLD-MCNC: 13 G/DL — SIGNIFICANT CHANGE UP (ref 11.5–15.5)
MAGNESIUM SERPL-MCNC: 1.7 MG/DL — LOW (ref 1.8–2.6)
MCHC RBC-ENTMCNC: 29.7 PG — SIGNIFICANT CHANGE UP (ref 27–34)
MCHC RBC-ENTMCNC: 33.5 GM/DL — SIGNIFICANT CHANGE UP (ref 32–36)
MCV RBC AUTO: 88.8 FL — SIGNIFICANT CHANGE UP (ref 80–100)
PLATELET # BLD AUTO: 302 K/UL — SIGNIFICANT CHANGE UP (ref 150–400)
POTASSIUM SERPL-MCNC: 3.6 MMOL/L — SIGNIFICANT CHANGE UP (ref 3.5–5.3)
POTASSIUM SERPL-SCNC: 3.6 MMOL/L — SIGNIFICANT CHANGE UP (ref 3.5–5.3)
RBC # BLD: 4.37 M/UL — SIGNIFICANT CHANGE UP (ref 3.8–5.2)
RBC # FLD: 12.6 % — SIGNIFICANT CHANGE UP (ref 10.3–14.5)
SODIUM SERPL-SCNC: 138 MMOL/L — SIGNIFICANT CHANGE UP (ref 135–145)
WBC # BLD: 17.03 K/UL — HIGH (ref 3.8–10.5)
WBC # FLD AUTO: 17.03 K/UL — HIGH (ref 3.8–10.5)

## 2022-09-11 PROCEDURE — 88302 TISSUE EXAM BY PATHOLOGIST: CPT

## 2022-09-11 PROCEDURE — C1729: CPT

## 2022-09-11 PROCEDURE — 71045 X-RAY EXAM CHEST 1 VIEW: CPT | Mod: 26

## 2022-09-11 PROCEDURE — 85730 THROMBOPLASTIN TIME PARTIAL: CPT

## 2022-09-11 PROCEDURE — 81025 URINE PREGNANCY TEST: CPT

## 2022-09-11 PROCEDURE — 85027 COMPLETE CBC AUTOMATED: CPT

## 2022-09-11 PROCEDURE — 85610 PROTHROMBIN TIME: CPT

## 2022-09-11 PROCEDURE — 85025 COMPLETE CBC W/AUTO DIFF WBC: CPT

## 2022-09-11 PROCEDURE — S2900: CPT

## 2022-09-11 PROCEDURE — 82962 GLUCOSE BLOOD TEST: CPT

## 2022-09-11 PROCEDURE — 83735 ASSAY OF MAGNESIUM: CPT

## 2022-09-11 PROCEDURE — C9399: CPT

## 2022-09-11 PROCEDURE — C1889: CPT

## 2022-09-11 PROCEDURE — 36415 COLL VENOUS BLD VENIPUNCTURE: CPT

## 2022-09-11 PROCEDURE — 71045 X-RAY EXAM CHEST 1 VIEW: CPT

## 2022-09-11 PROCEDURE — 80048 BASIC METABOLIC PNL TOTAL CA: CPT

## 2022-09-11 PROCEDURE — 88311 DECALCIFY TISSUE: CPT

## 2022-09-11 RX ORDER — RIVAROXABAN 15 MG-20MG
15 KIT ORAL
Refills: 0 | Status: DISCONTINUED | OUTPATIENT
Start: 2022-09-11 | End: 2022-09-11

## 2022-09-11 RX ORDER — MAGNESIUM SULFATE 500 MG/ML
2 VIAL (ML) INJECTION ONCE
Refills: 0 | Status: COMPLETED | OUTPATIENT
Start: 2022-09-11 | End: 2022-09-11

## 2022-09-11 RX ORDER — SENNA PLUS 8.6 MG/1
1 TABLET ORAL
Qty: 7 | Refills: 0
Start: 2022-09-11 | End: 2022-09-17

## 2022-09-11 RX ORDER — POTASSIUM CHLORIDE 20 MEQ
40 PACKET (EA) ORAL ONCE
Refills: 0 | Status: COMPLETED | OUTPATIENT
Start: 2022-09-11 | End: 2022-09-11

## 2022-09-11 RX ORDER — METHOCARBAMOL 500 MG/1
1 TABLET, FILM COATED ORAL
Qty: 15 | Refills: 0
Start: 2022-09-11 | End: 2022-09-15

## 2022-09-11 RX ADMIN — Medication 975 MILLIGRAM(S): at 09:04

## 2022-09-11 RX ADMIN — Medication 40 MILLIEQUIVALENT(S): at 05:24

## 2022-09-11 RX ADMIN — OXYCODONE HYDROCHLORIDE 5 MILLIGRAM(S): 5 TABLET ORAL at 05:18

## 2022-09-11 RX ADMIN — RIVAROXABAN 15 MILLIGRAM(S): KIT at 06:45

## 2022-09-11 RX ADMIN — PANTOPRAZOLE SODIUM 40 MILLIGRAM(S): 20 TABLET, DELAYED RELEASE ORAL at 06:45

## 2022-09-11 RX ADMIN — OXYCODONE HYDROCHLORIDE 5 MILLIGRAM(S): 5 TABLET ORAL at 11:46

## 2022-09-11 RX ADMIN — Medication 975 MILLIGRAM(S): at 09:40

## 2022-09-11 RX ADMIN — OXYCODONE HYDROCHLORIDE 5 MILLIGRAM(S): 5 TABLET ORAL at 06:00

## 2022-09-11 RX ADMIN — Medication 25 GRAM(S): at 05:24

## 2022-09-11 RX ADMIN — OXYCODONE HYDROCHLORIDE 5 MILLIGRAM(S): 5 TABLET ORAL at 11:20

## 2022-09-11 NOTE — PHYSICAL THERAPY INITIAL EVALUATION ADULT - GENERAL OBSERVATIONS, REHAB EVAL
Pt received in chair, + IV Loc, +Tele/, breathing on RA in NAD, in 0/10 pain, agreeable to PT evaluation

## 2022-09-11 NOTE — DISCHARGE NOTE PROVIDER - NSDCFUADDINST_GEN_ALL_CORE_FT
Please call the Cardiothoracic Surgery office at [phone] if you are experiencing any shortness of breath, chest pain, fevers or chills, drainage from the incisions, persistent nausea, vomiting or if you have any questions about your medications. If the symptoms are severe, call 911 and go to the nearest hospital.

## 2022-09-11 NOTE — DISCHARGE NOTE PROVIDER - HOSPITAL COURSE
32F with PMH thoracic outlet syndrome, RT subclavian thrombus s/p thrombectomy now s/p elective RT Robotic 1st rib resection. Chest tube removed POD1 without incident. Postop course otherwise uneventful. Cleared for discharge home today by Dr. Peoples.      32F with PMH thoracic outlet syndrome, RT subclavian thrombus s/p thrombectomy now s/p elective RT Robotic 1st rib resection. Chest tube removed POD1 without incident. Postop course otherwise uneventful. pt remains hemodynamically stable and determined stable for discharge home today as per Dr. Peoples.

## 2022-09-11 NOTE — DISCHARGE NOTE PROVIDER - NSDCCPTREATMENT_GEN_ALL_CORE_FT
PRINCIPAL PROCEDURE  Procedure: First rib resection  Findings and Treatment: Robotic assisted Right VATS First rib resection

## 2022-09-11 NOTE — DISCHARGE NOTE PROVIDER - NSDCPNSUBOBJ_GEN_ALL_CORE
Brief summary:  32yFemale POD# 2 Right robotic VATS 1st rib resection     SUBJECTIVE:   Pt OOB to chair on rm air. Pt states, I feel ok.  Patient denies acute pain with radiating or aggravating factors.  She denies chest pain, shortness of breath, palpitations, headache, dizziness, nausea, or vomiting.      Overnight events:  Some anxiety overnight which has subsided as per patient     PAST MEDICAL & SURGICAL HISTORY:  Thoracic outlet syndrome      Gastric peptic ulcer  hx of      Right subclavian vein thrombosis      Arm fracture, right  repair 1999          MEDICATION  acetaminophen     Tablet .. 975 milliGRAM(s) Oral every 6 hours PRN  amitriptyline 10 milliGRAM(s) Oral at bedtime  lidocaine   4% Patch 1 Patch Transdermal daily  methocarbamol 750 milliGRAM(s) Oral three times a day PRN  oxyCODONE    IR 5 milliGRAM(s) Oral every 4 hours PRN  oxyCODONE    IR 10 milliGRAM(s) Oral every 4 hours PRN  oxyCODONE    IR 5 milliGRAM(s) Oral once  pantoprazole    Tablet 40 milliGRAM(s) Oral before breakfast  rivaroxaban 15 milliGRAM(s) Oral two times a day  senna 1 Tablet(s) Oral at bedtime  MEDICATIONS  (PRN):  acetaminophen     Tablet .. 975 milliGRAM(s) Oral every 6 hours PRN Mild Pain (1 - 3)  methocarbamol 750 milliGRAM(s) Oral three times a day PRN Muscle Spasm  oxyCODONE    IR 5 milliGRAM(s) Oral every 4 hours PRN Moderate Pain (4 - 6)  oxyCODONE    IR 10 milliGRAM(s) Oral every 4 hours PRN Severe Pain (7 - 10)      Daily     Daily                               13.0   17.03 )-----------( 302      ( 11 Sep 2022 02:20 )             38.8   09-11    138  |  100  |  6.9<L>  ----------------------------<  111<H>  3.6   |  26.0  |  0.45<L>    Ca    8.8      11 Sep 2022 02:20  Mg     1.7     09-11        PT/INR - ( 09 Sep 2022 09:45 )   PT: 12.3 sec;   INR: 1.06 ratio         PTT - ( 09 Sep 2022 09:45 )  PTT:35.1 sec      Objective:  T(C): 36.7 (09-11-22 @ 04:00), Max: 37 (09-10-22 @ 13:04)  HR: 74 (09-11-22 @ 04:00) (74 - 106)  BP: 134/63 (09-11-22 @ 04:00) (117/77 - 154/80)  RR: 20 (09-11-22 @ 04:00) (12 - 26)  SpO2: 99% (09-11-22 @ 04:00) (94% - 99%)  Wt(kg): --CAPILLARY BLOOD GLUCOSE      I&O's Summary    10 Sep 2022 07:01  -  11 Sep 2022 07:00  --------------------------------------------------------  IN: 600 mL / OUT: 2005 mL / NET: -1405 mL    PHYSCIAL EXAM   Neuro: A+O x 3, non-focal, speech clear and intact  HEENT:  NCAT, PERRL, EOMI. No conjuctival edema or icterus, no thrush.  Neck: supple, trachea midline  Pulm: Left CTA, Right Lower base diminished, no rales/rhonchi/wheezing, no accessory muscle use noted  CV: regular rate, regular rhythm, +S1S2, no murmur or rub noted  Abd: soft, NT, ND, + BS  Ext: BRAVO x 4, no edema, no cyanosis or clubbing, 2+ DP b/l, distal motor/neuro/circ intact.   Skin: warm, dry, well perfused    CXR: Pending final read but no ptx apprecicated

## 2022-09-11 NOTE — DISCHARGE NOTE NURSING/CASE MANAGEMENT/SOCIAL WORK - NSDCFUADDAPPT_GEN_ALL_CORE_FT
Please call CT surgery office at [phone] tomorrow morning to make a follow-up appointment with Dr. Peoples in 2 weeks.     The cardiac surgery office is located at [facility], first floor. Take a left at the end of the lobby until the end of that roth (past the elevator bank). Make a left and the office is on your right across from the elevators.

## 2022-09-11 NOTE — DISCHARGE NOTE PROVIDER - NSDCMRMEDTOKEN_GEN_ALL_CORE_FT
Elavil 10 mg oral tablet: 1 tab(s) orally once a day (at bedtime)  Vistaril 25 mg oral capsule: 1 cap(s) orally once a day (at bedtime)  Xarelto 15 mg oral tablet: 1 tab(s) orally 2 times a day    Elavil 10 mg oral tablet: 1 tab(s) orally once a day (at bedtime)  methocarbamol 750 mg oral tablet: 1 tab(s) orally 3 times a day, As needed, Muscle Spasm  oxycodone-acetaminophen 5 mg-325 mg oral tablet: 1 tab(s) orally every 6 hours MDD:4 tabs  senna leaf extract oral tablet: 1 tab(s) orally once a day (at bedtime)  Vistaril 25 mg oral capsule: 1 cap(s) orally once a day (at bedtime)  Xarelto 15 mg oral tablet: 1 tab(s) orally 2 times a day

## 2022-09-11 NOTE — DISCHARGE NOTE NURSING/CASE MANAGEMENT/SOCIAL WORK - NSDCPEFALRISK_GEN_ALL_CORE
For information on Fall & Injury Prevention, visit: [email or web address] OR  [email or web address] OR  https://www.cdc.gov/steadi/patient.html

## 2022-09-11 NOTE — DISCHARGE NOTE PROVIDER - CARE PROVIDER_API CALL
Jose J Peoples)  Thoracic Surgery  [address] [place] [id number]  Phone: [phone]  Fax: [phone]  Follow Up Time:

## 2022-09-11 NOTE — DISCHARGE NOTE NURSING/CASE MANAGEMENT/SOCIAL WORK - PATIENT PORTAL LINK FT
You can access the FollowMyHealth Patient Portal offered by [facility] by registering at the following website: [email or web address]. By joining [employer]’s FollowMyHealth portal, you will also be able to view your health information using other applications (apps) compatible with our system.

## 2022-09-11 NOTE — PHYSICAL THERAPY INITIAL EVALUATION ADULT - ADDITIONAL COMMENTS
Pt reports living in apartment with  who is able to assist 2 flights of stairs to enter with handrail and no stairs inside. Independent prior to admission. Owns no DME.

## 2022-09-11 NOTE — DISCHARGE NOTE PROVIDER - NSDCFUADDAPPT_GEN_ALL_CORE_FT
Please call CT surgery office at [phone] tomorrow morning to make a follow-up appointment with Dr. Peoples in 1-2 weeks.  Please call CT surgery office at [phone] tomorrow morning to make a follow-up appointment with Dr. Peoples in 2 weeks.     The cardiac surgery office is located at [facility], first floor. Take a left at the end of the lobby until the end of that roth (past the elevator bank). Make a left and the office is on your right across from the elevators.

## 2022-09-11 NOTE — DISCHARGE NOTE PROVIDER - NSDCCPCAREPLAN_GEN_ALL_CORE_FT
PRINCIPAL DISCHARGE DIAGNOSIS  Diagnosis: Thoracic outlet syndrome  Assessment and Plan of Treatment: Please call Dr. Peoples's office at [phone] tomorrow or the next business day to make a followup appointment.   Leave dressing intact until tomorrow evening, reinforcing with tape if necessary (36-48 hours from chest tube removal). At that time you may remove the dressing and take a shower. Place clean gauze over wound if continual drainage. No ointments or lotions on the incision.   Take all medications as ordered. Continue a stool softener as needed. Continue to use your incentive spirometer and increase your activity as tolerated. No baths or swimming. You may not return to work or drive until cleared by surgeon. Call the office if you experience any fevers, shortness of breath, chest pain or excessive drainage from the incision, day or night. Go to the emergency room if any of these symptoms are severe.       PRINCIPAL DISCHARGE DIAGNOSIS  Diagnosis: Thoracic outlet syndrome  Assessment and Plan of Treatment: Please call Dr. Peoples's office at [phone] tomorrow or the next business day to make a followup appointment for 2 weeks from    Leave dressing intact until tomorrow evening, reinforcing with tape if necessary (36-48 hours from chest tube removal). At that time you may remove the dressing and take a shower. Place clean gauze over wound if continual drainage. No ointments or lotions on the incision.   Take all medications as ordered. Continue a stool softener as needed. Continue to use your incentive spirometer and increase your activity as tolerated. No baths or swimming. You may not return to work or drive until cleared by surgeon. Call the office if you experience any fevers, shortness of breath, chest pain or excessive drainage from the incision, day or night. Go to the emergency room if any of these symptoms are severe.

## 2022-09-11 NOTE — PHYSICAL THERAPY INITIAL EVALUATION ADULT - ORIENTATION, REHAB EVAL
I performed a face-to-face evaluation of the patient and performed a history and physical examination. I agree with the history and physical examination.    Kera: Seen in ED several times over the last few days for ETOH use. Found to have COVID. Was discharged a short time ago; apparently, he lives in [place] and was going to get himself to [place]. Returns here b/c he said he syncopized just after leaving here. Said he had antecedent CP and SOB. Check CT brain, EKG, and trop. Admit for syncope w/u. oriented to person, place, time and situation

## 2022-09-15 ENCOUNTER — APPOINTMENT (OUTPATIENT)
Dept: THORACIC SURGERY | Facility: CLINIC | Age: 32
End: 2022-09-15

## 2022-09-15 VITALS
RESPIRATION RATE: 16 BRPM | DIASTOLIC BLOOD PRESSURE: 61 MMHG | HEIGHT: 63 IN | SYSTOLIC BLOOD PRESSURE: 143 MMHG | OXYGEN SATURATION: 97 % | HEART RATE: 65 BPM

## 2022-09-15 DIAGNOSIS — R05.3 CHRONIC COUGH: ICD-10-CM

## 2022-09-15 PROCEDURE — 99024 POSTOP FOLLOW-UP VISIT: CPT

## 2022-09-15 RX ORDER — RIVAROXABAN 20 MG/1
20 TABLET, FILM COATED ORAL
Refills: 0 | Status: COMPLETED | COMMUNITY
End: 2022-09-15

## 2022-09-20 LAB — SURGICAL PATHOLOGY STUDY: SIGNIFICANT CHANGE UP

## 2022-09-23 LAB — SARS-COV-2 N GENE NPH QL NAA+PROBE: NOT DETECTED

## 2022-09-26 ENCOUNTER — APPOINTMENT (OUTPATIENT)
Dept: VASCULAR SURGERY | Facility: CLINIC | Age: 32
End: 2022-09-26

## 2022-09-26 VITALS
HEART RATE: 87 BPM | TEMPERATURE: 97.8 F | BODY MASS INDEX: 26.93 KG/M2 | WEIGHT: 152 LBS | OXYGEN SATURATION: 98 % | HEIGHT: 63 IN | DIASTOLIC BLOOD PRESSURE: 87 MMHG | SYSTOLIC BLOOD PRESSURE: 120 MMHG

## 2022-09-26 PROCEDURE — 99214 OFFICE O/P EST MOD 30 MIN: CPT

## 2022-10-10 NOTE — HISTORY OF PRESENT ILLNESS
[FreeTextEntry1] : 30 yo F with history of anxiety and PUD recently admitted for RUE subclavian vein thrombosis with right hand parasthesias and swelling s/p RUE mechanical thrombectomy (INARI) on 8/10/22, diagnosed with Paget Schroetter. Patient doing well. Evaluated by Dr. Peoples (CTS) for robotic L first rib resection. \par Denies chest pain, SOB, MAGUIRE, arm discoloration or swelling. Discharged on xarelto. [de-identified] : s/p robotic R first rib resection\par Healing well\par No complaints\par Pain controlled\par Tolerating anticoagulation

## 2022-10-10 NOTE — ASSESSMENT
[Arterial/Venous Disease] : arterial/venous disease [Medication Management] : medication management [FreeTextEntry1] : 30 yo F with history of anxiety and PUD recently admitted for RUE subclavian vein thrombosis with right hand parasthesias and swelling s/p RUE mechanical thrombectomy (INARI) on 8/10/22, diagnosed with Paget Schroetter. \par s/p robotic R first rib resection\par \par \par \par

## 2022-10-10 NOTE — PHYSICAL EXAM
[Normal Rate and Rhythm] : normal rate and rhythm [2+] : left 2+ [No Rash or Lesion] : No rash or lesion [Alert] : alert [Oriented to Person] : oriented to person [Oriented to Place] : oriented to place [Oriented to Time] : oriented to time [Calm] : calm [Ankle Swelling (On Exam)] : not present [Varicose Veins Of Lower Extremities] : not present [] : not present [Abdomen Tenderness] : ~T ~M No abdominal tenderness [de-identified] : NAD [de-identified] : unlabored breathing [FreeTextEntry1] : no arm edema [de-identified] : FROM of all 4 extremities\par

## 2022-10-13 ENCOUNTER — APPOINTMENT (OUTPATIENT)
Dept: THORACIC SURGERY | Facility: CLINIC | Age: 32
End: 2022-10-13

## 2022-10-13 VITALS
OXYGEN SATURATION: 98 % | HEIGHT: 63 IN | WEIGHT: 150 LBS | DIASTOLIC BLOOD PRESSURE: 83 MMHG | HEART RATE: 79 BPM | BODY MASS INDEX: 26.58 KG/M2 | RESPIRATION RATE: 16 BRPM | SYSTOLIC BLOOD PRESSURE: 143 MMHG | TEMPERATURE: 98 F

## 2022-10-13 PROCEDURE — 99024 POSTOP FOLLOW-UP VISIT: CPT

## 2022-10-13 RX ORDER — AMITRIPTYLINE HYDROCHLORIDE 75 MG/1
TABLET, FILM COATED ORAL
Refills: 0 | Status: COMPLETED | COMMUNITY
End: 2022-10-13

## 2022-10-13 RX ORDER — OXYCODONE AND ACETAMINOPHEN 5; 325 MG/1; MG/1
5-325 TABLET ORAL
Qty: 40 | Refills: 0 | Status: COMPLETED | COMMUNITY
End: 2022-10-13

## 2022-10-21 ENCOUNTER — OUTPATIENT (OUTPATIENT)
Dept: OUTPATIENT SERVICES | Facility: HOSPITAL | Age: 32
LOS: 1 days | End: 2022-10-21

## 2022-10-21 VITALS
RESPIRATION RATE: 16 BRPM | HEART RATE: 74 BPM | OXYGEN SATURATION: 98 % | SYSTOLIC BLOOD PRESSURE: 90 MMHG | WEIGHT: 160.94 LBS | HEIGHT: 63 IN | DIASTOLIC BLOOD PRESSURE: 60 MMHG

## 2022-10-21 DIAGNOSIS — F41.9 ANXIETY DISORDER, UNSPECIFIED: ICD-10-CM

## 2022-10-21 DIAGNOSIS — Z86.718 PERSONAL HISTORY OF OTHER VENOUS THROMBOSIS AND EMBOLISM: Chronic | ICD-10-CM

## 2022-10-21 DIAGNOSIS — Z98.890 OTHER SPECIFIED POSTPROCEDURAL STATES: Chronic | ICD-10-CM

## 2022-10-21 DIAGNOSIS — S42.301A UNSPECIFIED FRACTURE OF SHAFT OF HUMERUS, RIGHT ARM, INITIAL ENCOUNTER FOR CLOSED FRACTURE: Chronic | ICD-10-CM

## 2022-10-21 DIAGNOSIS — G54.0 BRACHIAL PLEXUS DISORDERS: ICD-10-CM

## 2022-10-21 DIAGNOSIS — U07.1 COVID-19: ICD-10-CM

## 2022-10-21 DIAGNOSIS — Z01.818 ENCOUNTER FOR OTHER PREPROCEDURAL EXAMINATION: ICD-10-CM

## 2022-10-21 LAB
ANION GAP SERPL CALC-SCNC: 9 MMOL/L — SIGNIFICANT CHANGE UP (ref 5–17)
APTT BLD: 37.5 SEC — HIGH (ref 27.5–35.5)
BASOPHILS # BLD AUTO: 0.1 K/UL — SIGNIFICANT CHANGE UP (ref 0–0.2)
BASOPHILS NFR BLD AUTO: 1.2 % — SIGNIFICANT CHANGE UP (ref 0–2)
BLD GP AB SCN SERPL QL: SIGNIFICANT CHANGE UP
BUN SERPL-MCNC: 14.8 MG/DL — SIGNIFICANT CHANGE UP (ref 8–20)
CALCIUM SERPL-MCNC: 9.3 MG/DL — SIGNIFICANT CHANGE UP (ref 8.4–10.5)
CHLORIDE SERPL-SCNC: 101 MMOL/L — SIGNIFICANT CHANGE UP (ref 98–107)
CO2 SERPL-SCNC: 27 MMOL/L — SIGNIFICANT CHANGE UP (ref 22–29)
CREAT SERPL-MCNC: 0.39 MG/DL — LOW (ref 0.5–1.3)
EGFR: 134 ML/MIN/1.73M2 — SIGNIFICANT CHANGE UP
EOSINOPHIL # BLD AUTO: 0.21 K/UL — SIGNIFICANT CHANGE UP (ref 0–0.5)
EOSINOPHIL NFR BLD AUTO: 2.5 % — SIGNIFICANT CHANGE UP (ref 0–6)
GLUCOSE SERPL-MCNC: 106 MG/DL — HIGH (ref 70–99)
HCT VFR BLD CALC: 40.9 % — SIGNIFICANT CHANGE UP (ref 34.5–45)
HGB BLD-MCNC: 13.7 G/DL — SIGNIFICANT CHANGE UP (ref 11.5–15.5)
IMM GRANULOCYTES NFR BLD AUTO: 0.4 % — SIGNIFICANT CHANGE UP (ref 0–0.9)
INR BLD: 1.74 RATIO — HIGH (ref 0.88–1.16)
LYMPHOCYTES # BLD AUTO: 2.65 K/UL — SIGNIFICANT CHANGE UP (ref 1–3.3)
LYMPHOCYTES # BLD AUTO: 31.1 % — SIGNIFICANT CHANGE UP (ref 13–44)
MCHC RBC-ENTMCNC: 29.8 PG — SIGNIFICANT CHANGE UP (ref 27–34)
MCHC RBC-ENTMCNC: 33.5 GM/DL — SIGNIFICANT CHANGE UP (ref 32–36)
MCV RBC AUTO: 88.9 FL — SIGNIFICANT CHANGE UP (ref 80–100)
MONOCYTES # BLD AUTO: 0.54 K/UL — SIGNIFICANT CHANGE UP (ref 0–0.9)
MONOCYTES NFR BLD AUTO: 6.3 % — SIGNIFICANT CHANGE UP (ref 2–14)
NEUTROPHILS # BLD AUTO: 4.99 K/UL — SIGNIFICANT CHANGE UP (ref 1.8–7.4)
NEUTROPHILS NFR BLD AUTO: 58.5 % — SIGNIFICANT CHANGE UP (ref 43–77)
PLATELET # BLD AUTO: 403 K/UL — HIGH (ref 150–400)
POTASSIUM SERPL-MCNC: 4.3 MMOL/L — SIGNIFICANT CHANGE UP (ref 3.5–5.3)
POTASSIUM SERPL-SCNC: 4.3 MMOL/L — SIGNIFICANT CHANGE UP (ref 3.5–5.3)
PROTHROM AB SERPL-ACNC: 20.3 SEC — HIGH (ref 10.5–13.4)
RBC # BLD: 4.6 M/UL — SIGNIFICANT CHANGE UP (ref 3.8–5.2)
RBC # FLD: 12.5 % — SIGNIFICANT CHANGE UP (ref 10.3–14.5)
SARS-COV-2 RNA SPEC QL NAA+PROBE: SIGNIFICANT CHANGE UP
SODIUM SERPL-SCNC: 137 MMOL/L — SIGNIFICANT CHANGE UP (ref 135–145)
WBC # BLD: 8.52 K/UL — SIGNIFICANT CHANGE UP (ref 3.8–10.5)
WBC # FLD AUTO: 8.52 K/UL — SIGNIFICANT CHANGE UP (ref 3.8–10.5)

## 2022-10-21 RX ORDER — SODIUM CHLORIDE 9 MG/ML
3 INJECTION INTRAMUSCULAR; INTRAVENOUS; SUBCUTANEOUS ONCE
Refills: 0 | Status: DISCONTINUED | OUTPATIENT
Start: 2022-10-26 | End: 2022-11-09

## 2022-10-21 NOTE — H&P PST ADULT - NSICDXPASTSURGICALHX_GEN_ALL_CORE_FT
PAST SURGICAL HISTORY:  Arm fracture, right repair 1999     PAST SURGICAL HISTORY:  Arm fracture, right repair 1999    H/O thrombosis right subclavian    History of resection of rib right 1st rib

## 2022-10-21 NOTE — H&P PST ADULT - HISTORY OF PRESENT ILLNESS
32 yr old right hand dominant  female with history of covid in 7/2022,  anxiety and PUD presents with c/o right arm swelling and numbness  in Aug 2022. Pt denies any hx of trauma or heavy lifting. RUE subclavian vein thrombosis was noted . thrombectomy  was done 8/10/22 pt was diagnosed with Paget Schroetter .first right  rib resection was done on 9/9/22 with Vlad Russo. Pt denies any swelling or pain since then , currently on Xarelto (was instructed to hold 2 days preop ) . Pt is now scheduled for right upper extremity venogram, possible angioplasty possible stent on 10/26/22. Pt is , works as .

## 2022-10-21 NOTE — H&P PST ADULT - NSICDXPASTMEDICALHX_GEN_ALL_CORE_FT
PAST MEDICAL HISTORY:  Gastric peptic ulcer hx of    Right subclavian vein thrombosis     Thoracic outlet syndrome      PAST MEDICAL HISTORY:  2019 novel coronavirus disease (COVID-19) july 2022    Gastric peptic ulcer hx of    Right subclavian vein thrombosis     Thoracic outlet syndrome

## 2022-10-21 NOTE — H&P PST ADULT - ASSESSMENT
32 yr old right hand dominant  female with history of covid in 2022,  anxiety and PUD presents with c/o right arm swelling and numbness  in Aug 2022. Pt denies any hx of trauma or heavy lifting. RUE subclavian vein thrombosis was noted . thrombectomy  was done 8/10/22 pt was diagnosed with Paget Schroetter .first right  rib resection was done on 22 with Vlad Russo. Pt denies any swelling or pain since then , currently on Xarelto (was instructed to hold 2 days preop ) . Pt is now scheduled for right upper extremity venogram, possible angioplasty possible stent on 10/26/22. Pt is , works as . covid PCR done at [facility] was vaccinated  x3.  OPIOID RISK TOOL    EDILBERTO EACH BOX THAT APPLIES AND ADD TOTALS AT THE END    FAMILY HISTORY OF SUBSTANCE ABUSE                 FEMALE         MALE                                                Alcohol                             [  ]1 pt          [  ]3pts                                               Illegal Durgs                     [  ]2 pts        [  ]3pts                                               Rx Drugs                           [  ]4 pts        [  ]4 pts    PERSONAL HISTORY OF SUBSTANCE ABUSE                                                                                          Alcohol                             [  ]3 pts       [  ]3 pts                                               Illegal Durgs                     [  ]4 pts        [  ]4 pts                                               Rx Drugs                           [  ]5 pts        [  ]5 pts    AGE BETWEEN 16-45 YEARS                                      [X  ]1 pt         [  ]1 pt    HISTORY OF PREADOLESCENT   SEXUAL ABUSE                                                             [  ]3 pts        [  ]0pts    PSYCHOLOGICAL DISEASE                     ADD, OCD, Bipolar, Schizophrenia        [  ]2 pts         [  ]2 pts                      Depression                                               [  ]1 pt           [  ]1 pt           SCORING TOTAL   (add numbers and type here)              (***1)                                     A score of 3 or lower indicated LOW risk for future opiod abuse  A score of 4 to 7 indicated moderate risk for future opiod abuse  A score of 8 or higher indicates a high risk for opiod abuse  CAPRINI VTE 2.0 SCORE [CLOT updated 2019]    AGE RELATED RISK FACTORS                                                       MOBILITY RELATED FACTORS  [ ] Age 41-60 years                                            (1 Point)                    [ ] Bed rest                                                        (1 Point)  [ ] Age: 61-74 years                                           (2 Points)                  [ ] Plaster cast                                                   (2 Points)  [ ] Age= 75 years                                              (3 Points)                    [ ] Bed bound for more than 72 hours                 (2 Points)    DISEASE RELATED RISK FACTORS                                               GENDER SPECIFIC FACTORS  [ ] Edema in the lower extremities                       (1 Point)              [ ] Pregnancy                                                     (1 Point)  [ ] Varicose veins                                               (1 Point)                     [ ] Post-partum < 6 weeks                                   (1 Point)             [X ] BMI > 25 Kg/m2                                            (1 Point)                     [ ] Hormonal therapy  or oral contraception          (1 Point)                 [ ] Sepsis (in the previous month)                        (1 Point)               [ ] History of pregnancy complications                 (1 point)  [ ] Pneumonia or serious lung disease                                               [ ] Unexplained or recurrent                     (1 Point)           (in the previous month)                               (1 Point)  [ ] Abnormal pulmonary function test                     (1 Point)                 SURGERY RELATED RISK FACTORS  [ ] Acute myocardial infarction                              (1 Point)               [ ]  Section                                             (1 Point)  [ ] Congestive heart failure (in the previous month)  (1 Point)      [ ] Minor surgery                                                  (1 Point)   [ ] Inflammatory bowel disease                             (1 Point)               [ ] Arthroscopic surgery                                        (2 Points)  [ ] Central venous access                                      (2 Points)                [ X] General surgery lasting more than 45 minutes (2 points)  [ ] Malignancy- Present or previous                   (2 Points)                [ ] Elective arthroplasty                                         (5 points)    [ ] Stroke (in the previous month)                          (5 Points)                                                                                                                                                           HEMATOLOGY RELATED FACTORS                                                 TRAUMA RELATED RISK FACTORS  [ X] Prior episodes of VTE                                     (3 Points)                [ ] Fracture of the hip, pelvis, or leg                       (5 Points)  [ ] Positive family history for VTE                         (3 Points)             [ ] Acute spinal cord injury (in the previous month)  (5 Points)  [ ] Prothrombin 06226 A                                     (3 Points)               [ ] Paralysis  (less than 1 month)                             (5 Points)  [ ] Factor V Leiden                                             (3 Points)                  [ ] Multiple Trauma within 1 month                        (5 Points)  [ ] Lupus anticoagulants                                     (3 Points)                                                           [ ] Anticardiolipin antibodies                               (3 Points)                                                       [ ] High homocysteine in the blood                      (3 Points)                                             [ ] Other congenital or acquired thrombophilia      (3 Points)                                                [ ] Heparin induced thrombocytopenia                  (3 Points)                                     Total Score [      6    ]

## 2022-10-26 ENCOUNTER — TRANSCRIPTION ENCOUNTER (OUTPATIENT)
Age: 32
End: 2022-10-26

## 2022-10-26 ENCOUNTER — OUTPATIENT (OUTPATIENT)
Dept: OUTPATIENT SERVICES | Facility: HOSPITAL | Age: 32
LOS: 1 days | Discharge: HOME | End: 2022-10-26
Payer: COMMERCIAL

## 2022-10-26 ENCOUNTER — APPOINTMENT (OUTPATIENT)
Dept: VASCULAR SURGERY | Facility: HOSPITAL | Age: 32
End: 2022-10-26

## 2022-10-26 VITALS
SYSTOLIC BLOOD PRESSURE: 126 MMHG | TEMPERATURE: 98 F | RESPIRATION RATE: 16 BRPM | DIASTOLIC BLOOD PRESSURE: 73 MMHG | OXYGEN SATURATION: 97 % | HEART RATE: 76 BPM

## 2022-10-26 VITALS
HEART RATE: 80 BPM | OXYGEN SATURATION: 98 % | SYSTOLIC BLOOD PRESSURE: 122 MMHG | RESPIRATION RATE: 25 BRPM | DIASTOLIC BLOOD PRESSURE: 62 MMHG

## 2022-10-26 DIAGNOSIS — Z98.890 OTHER SPECIFIED POSTPROCEDURAL STATES: Chronic | ICD-10-CM

## 2022-10-26 DIAGNOSIS — S42.301A UNSPECIFIED FRACTURE OF SHAFT OF HUMERUS, RIGHT ARM, INITIAL ENCOUNTER FOR CLOSED FRACTURE: Chronic | ICD-10-CM

## 2022-10-26 DIAGNOSIS — I87.1 COMPRESSION OF VEIN: ICD-10-CM

## 2022-10-26 DIAGNOSIS — Z86.718 PERSONAL HISTORY OF OTHER VENOUS THROMBOSIS AND EMBOLISM: Chronic | ICD-10-CM

## 2022-10-26 DIAGNOSIS — G54.0 BRACHIAL PLEXUS DISORDERS: ICD-10-CM

## 2022-10-26 PROBLEM — U07.1 COVID-19: Chronic | Status: ACTIVE | Noted: 2022-10-21

## 2022-10-26 LAB
APTT BLD: 31 SEC — SIGNIFICANT CHANGE UP (ref 27.5–35.5)
HCG SERPL QL: NEGATIVE — SIGNIFICANT CHANGE UP
INR BLD: 1.07 RATIO — SIGNIFICANT CHANGE UP (ref 0.88–1.16)
PROTHROM AB SERPL-ACNC: 12.4 SEC — SIGNIFICANT CHANGE UP (ref 10.5–13.4)

## 2022-10-26 PROCEDURE — C1725: CPT

## 2022-10-26 PROCEDURE — 37249 TRLUML BALO ANGIOP ADDL VEIN: CPT

## 2022-10-26 PROCEDURE — C1887: CPT

## 2022-10-26 PROCEDURE — 84703 CHORIONIC GONADOTROPIN ASSAY: CPT

## 2022-10-26 PROCEDURE — C1894: CPT

## 2022-10-26 PROCEDURE — 75827 VEIN X-RAY CHEST: CPT | Mod: 26,GC,59

## 2022-10-26 PROCEDURE — 36010 PLACE CATHETER IN VEIN: CPT

## 2022-10-26 PROCEDURE — 36415 COLL VENOUS BLD VENIPUNCTURE: CPT

## 2022-10-26 PROCEDURE — 85610 PROTHROMBIN TIME: CPT

## 2022-10-26 PROCEDURE — 37248 TRLUML BALO ANGIOP 1ST VEIN: CPT

## 2022-10-26 PROCEDURE — 75820 VEIN X-RAY ARM/LEG: CPT

## 2022-10-26 PROCEDURE — C1769: CPT

## 2022-10-26 PROCEDURE — 85730 THROMBOPLASTIN TIME PARTIAL: CPT

## 2022-10-26 PROCEDURE — 37248 TRLUML BALO ANGIOP 1ST VEIN: CPT | Mod: GC

## 2022-10-26 RX ORDER — GABAPENTIN 400 MG/1
1 CAPSULE ORAL
Qty: 0 | Refills: 0 | DISCHARGE

## 2022-10-26 RX ORDER — HYDROXYZINE HCL 10 MG
1 TABLET ORAL
Qty: 0 | Refills: 0 | DISCHARGE

## 2022-10-26 RX ORDER — ACETAMINOPHEN 500 MG
650 TABLET ORAL ONCE
Refills: 0 | Status: COMPLETED | OUTPATIENT
Start: 2022-10-26 | End: 2022-10-26

## 2022-10-26 RX ORDER — AMITRIPTYLINE HCL 25 MG
1 TABLET ORAL
Qty: 0 | Refills: 0 | DISCHARGE

## 2022-10-26 RX ORDER — SODIUM CHLORIDE 9 MG/ML
500 INJECTION INTRAMUSCULAR; INTRAVENOUS; SUBCUTANEOUS ONCE
Refills: 0 | Status: COMPLETED | OUTPATIENT
Start: 2022-10-26 | End: 2022-10-26

## 2022-10-26 RX ADMIN — Medication 650 MILLIGRAM(S): at 12:21

## 2022-10-26 RX ADMIN — SODIUM CHLORIDE 500 MILLILITER(S): 9 INJECTION INTRAMUSCULAR; INTRAVENOUS; SUBCUTANEOUS at 12:22

## 2022-10-26 NOTE — DISCHARGE NOTE NURSING/CASE MANAGEMENT/SOCIAL WORK - PATIENT PORTAL LINK FT
You can access the FollowMyHealth Patient Portal offered by [facility] by registering at the following website: [email or web address]. By joining [employer]’s FollowMyHealth portal, you will also be able to view your health information using other applications (apps) compatible with our system.

## 2022-10-26 NOTE — PROGRESS NOTE ADULT - SUBJECTIVE AND OBJECTIVE BOX
Assessment: Presents today for RSCA venogram +/- intervention to be performed by Dr. Lui  Seen in PST on 10/21/22 with no interval change, reviewed labs and ECG and WNL    ASA: 2  Mallampati: 1  Cr: 0.39  GFR: 134  BRA: 0.9%      ROS: as stated above, otherwise negative    PHYSICAL EXAM:  Constitutional: A & O x 3, NAD  HEENT:  Normal oral mucosa, PERRL, EOMI	  Cardiovascular: S1 S2, No murmurs, No JVD  Respiratory: Lungs clear to auscultation	  Gastrointestinal:  Soft, Non-tender, + BS	  Skin: No rashes or cyanosis  Neurologic: No deficit appreciated  Extremities: Normal range of motion, no edema  Vascular: distal pulses +       Plan:  -plan for RSCA venogram +/- intervention as indicated  -patient seen and examined  -confirmed appropriate NPO duration  -ECG and Labs reviewed  -NS 250mL IV bolus pre procedure  -procedure discussed with patient; risks and benefits explained, questions answered  -consent obtained by attending       Assessment: Presents today for RSCV venogram +/- intervention to be performed by Dr. Lui  Seen in PST on 10/21/22 with no interval change, reviewed labs and ECG and WNL    ASA: 2  Mallampati: 1  Cr: 0.39  GFR: 134  BRA: 0.9%      ROS: as stated above, otherwise negative    PHYSICAL EXAM:  Constitutional: A & O x 3, NAD  HEENT:  Normal oral mucosa, PERRL, EOMI	  Cardiovascular: S1 S2, No murmurs, No JVD  Respiratory: Lungs clear to auscultation	  Gastrointestinal:  Soft, Non-tender, + BS	  Skin: No rashes or cyanosis  Neurologic: No deficit appreciated  Extremities: Normal range of motion, no edema  Vascular: distal pulses +       Plan:  -plan for RSCV venogram +/- intervention as indicated  -patient seen and examined  -confirmed appropriate NPO duration  -ECG and Labs reviewed  -NS 250mL IV bolus pre procedure  -procedure discussed with patient; risks and benefits explained, questions answered  -consent obtained by attending

## 2022-10-26 NOTE — DISCHARGE NOTE PROVIDER - NSDCFUSCHEDAPPT_GEN_ALL_CORE_FT
Jose J Peoples  [facility] Physician [facility]  [facility] [address]  Scheduled Appointment: 11/10/2022    Ervin Coates  [facility] Physician [facility]  [facility] [address]  Scheduled Appointment: 11/11/2022

## 2022-10-26 NOTE — DISCHARGE NOTE PROVIDER - HOSPITAL COURSE
32 yr old right hand dominant  female with history of covid in 7/2022,  anxiety and PUD presents with c/o right arm swelling and numbness  in Aug 2022. Pt denies any hx of trauma or heavy lifting. RUE subclavian vein thrombosis was noted . thrombectomy  was done 8/10/22 pt was diagnosed with Paget Schroetter .first right  rib resection was done on 9/9/22 with Vlad Russo. Pt denies any swelling or pain since then , currently on Xarelto (was instructed to hold 2 days preop ) . Pt is now scheduled for right upper extremity venogram, possible angioplasty possible stent on 10/26/22. Pt is , works as .        Now s/p RSCV venogram s/p PTA, procedure performed by Dr. Lui, arrived to recovery in NAD and HDS, access site stable with DSD in place, no bleed/hematoma, distal pulse +,  plan for discharge home after recovery.    Plan:  -Formal cath report pending  -Post procedure management/monitoring per protocol  -Access site precautions  -Bedrest x 2hours post procedure  -NS 250mL bolus post cath   -Continue current medical therapy  -Restart xarelto tonight  -Educated regarding post procedure management and care  -F/U outpt in 1 week with Dr. Lui for follow up and suture removal right basilic access site  -DISPO: Plan for D/C home after post procedure recovery completed.        32 yr old right hand dominant  female with history of covid in 7/2022,  anxiety and PUD presents with c/o right arm swelling and numbness  in Aug 2022. Pt denies any hx of trauma or heavy lifting. RUE subclavian vein thrombosis was noted . thrombectomy  was done 8/10/22 pt was diagnosed with Paget Schroetter .first right  rib resection was done on 9/9/22 with Vlad Russo. Pt denies any swelling or pain since then , currently on Xarelto (was instructed to hold 2 days preop ) . Pt is now scheduled for right upper extremity venogram, possible angioplasty possible stent on 10/26/22. Pt is , works as .        Now s/p RSCV venogram s/p PTA via right basilic vein, procedure performed by Dr. Lui, arrived to recovery in NAD and HDS, access site stable with DSD in place, no bleed/hematoma, distal pulse +,  plan for discharge home after recovery.    Plan:  -Formal cath report pending  -Post procedure management/monitoring per protocol  -Access site precautions  -Bedrest x 2hours post procedure  -NS 250mL bolus post cath   -Continue current medical therapy  -Restart xarelto tonight  -Educated regarding post procedure management and care  -F/U outpt in 1 week with Dr. Lui for follow up and suture removal right basilic access site  -DISPO: Plan for D/C home after post procedure recovery completed.

## 2022-10-26 NOTE — DISCHARGE NOTE PROVIDER - NSDCFUADDINST_GEN_ALL_CORE_FT
You had been diagnosed with Paget Schroetter with thoracic outlet syndrome. Prior right subclavian vein thrombectomy followed by right rib resection 9/9/22, repeat venogram today with ballooning of residual narrowing.   No heavy lifting, excessine bending or range of motion on affected limb 5-7days. No submerging the site in water (pool/bath) 5-7days. NO cream,/lotion/medicines on the site.  Bruising at the site is normal. Call your doctor for any bleeding, swelling/hardness, increasing pain or redness, loss of sensation in the hand/leg or discoloration.  If heavy bleeding or large lumps form, hold pressure at the spot and come to the Emergency Room. Follow up with Dr uLi in one week.

## 2022-10-26 NOTE — DISCHARGE NOTE PROVIDER - NSDCMRMEDTOKEN_GEN_ALL_CORE_FT
gabapentin 300 mg oral capsule: 1 cap(s) orally 2 times a day  Xarelto 15 mg oral tablet: 1 tab(s) orally 2 times a day

## 2022-10-26 NOTE — DISCHARGE NOTE PROVIDER - NSDCCPCAREPLAN_GEN_ALL_CORE_FT
PRINCIPAL DISCHARGE DIAGNOSIS  Diagnosis: Subclavian vein stenosis  Assessment and Plan of Treatment: You had been diagnosed with Paget Schroetter with thoracic outlet syndrome. Prior right subclavian vein thrombectomy followed by right rib resection 9/9/22, repeat venogram today with ballooning of residual narrowing.

## 2022-10-26 NOTE — DISCHARGE NOTE PROVIDER - CARE PROVIDER_API CALL
ManvarSingh, Pallavi B (MD)  Vascular Surgery  [address], [address]st [place] [address]  Phone: [phone]  Fax: [phone]  Follow Up Time: 1 week

## 2022-10-26 NOTE — DISCHARGE NOTE NURSING/CASE MANAGEMENT/SOCIAL WORK - NSDCPEFALRISK_GEN_ALL_CORE
For information on Fall & Injury Prevention, visit: [email or web address] OR  [email or web address] OR  https://www.cdc.gov/steadi/patient.html

## 2022-10-26 NOTE — BRIEF OPERATIVE NOTE - NSICDXBRIEFPREOP_GEN_ALL_CORE_FT
PRE-OP DIAGNOSIS:  Paget-von Schroetter syndrome 26-Oct-2022 10:51:31  Josette Maurer  Thoracic outlet syndrome 26-Oct-2022 10:51:40  Josette Maurer

## 2022-10-26 NOTE — DISCHARGE NOTE PROVIDER - NSDCCPTREATMENT_GEN_ALL_CORE_FT
PRINCIPAL PROCEDURE  Procedure: SVC venogram  Findings and Treatment: You had been diagnosed with Paget Schroetter with thoracic outlet syndrome. Prior right subclavian vein thrombectomy followed by right rib resection 9/9/22, repeat venogram today with ballooning of residual narrowing.    No heavy lifting, excessine bending or range of motion on affected limb 5-7days.  No submerging the site in water (pool/bath) 5-7days. NO cream,/lotion/medicines on the site.   Bruising at the site is normal. Call your doctor for any bleeding, swelling/hardness, increasing pain or redness, loss of sensation in the hand/leg or discoloration.   If heavy bleeding or large lumps form, hold pressure at the spot and come to the Emergency Room.  Follow up with Dr Lui in one week.

## 2022-11-04 ENCOUNTER — APPOINTMENT (OUTPATIENT)
Dept: VASCULAR SURGERY | Facility: CLINIC | Age: 32
End: 2022-11-04

## 2022-11-04 PROCEDURE — 99214 OFFICE O/P EST MOD 30 MIN: CPT

## 2022-11-06 RX ORDER — HYDROXYZINE PAMOATE 25 MG/1
25 CAPSULE ORAL
Qty: 30 | Refills: 0 | Status: ACTIVE | COMMUNITY
Start: 2022-09-29

## 2022-11-06 RX ORDER — ALPRAZOLAM 0.25 MG/1
0.25 TABLET ORAL
Qty: 30 | Refills: 0 | Status: ACTIVE | COMMUNITY
Start: 2022-09-02

## 2022-11-06 RX ORDER — HYDROXYZINE HYDROCHLORIDE 25 MG/1
25 TABLET ORAL
Qty: 20 | Refills: 0 | Status: ACTIVE | COMMUNITY
Start: 2022-08-22

## 2022-11-06 RX ORDER — TRAMADOL HYDROCHLORIDE 50 MG/1
50 TABLET, COATED ORAL
Qty: 10 | Refills: 0 | Status: ACTIVE | COMMUNITY
Start: 2022-08-10

## 2022-11-06 RX ORDER — SENNOSIDES 8.6 MG
8.6 TABLET ORAL
Qty: 7 | Refills: 0 | Status: ACTIVE | COMMUNITY
Start: 2022-09-11

## 2022-11-06 RX ORDER — METHOCARBAMOL 750 MG/1
750 TABLET, FILM COATED ORAL
Qty: 15 | Refills: 0 | Status: ACTIVE | COMMUNITY
Start: 2022-09-11

## 2022-11-06 RX ORDER — CLOBETASOL PROPIONATE 0.5 MG/ML
0.05 SOLUTION TOPICAL
Qty: 50 | Refills: 0 | Status: ACTIVE | COMMUNITY
Start: 2022-07-30

## 2022-11-06 NOTE — ASSESSMENT
[Arterial/Venous Disease] : arterial/venous disease [FreeTextEntry1] : 32 yo F with history of anxiety and PUD recently admitted for RUE subclavian vein thrombosis with right hand parasthesias and swelling s/p RUE mechanical thrombectomy (INARI) on 8/10/22, diagnosed with Paget Schroetter. \par s/p robotic R first rib resection\par Now s/p RUE venogram and angioplasty of R subclavian and axillary veins on 10/26/22\par On xarelto\par \par \par  [Medication Management] : medication management

## 2022-11-06 NOTE — HISTORY OF PRESENT ILLNESS
[FreeTextEntry1] : 30 yo F with history of anxiety and PUD recently admitted for RUE subclavian vein thrombosis with right hand parasthesias and swelling s/p RUE mechanical thrombectomy (INARI) on 8/10/22, diagnosed with Paget Schroetter. Patient doing well. Evaluated by Dr. Peoples (CTS) for robotic L first rib resection. \par Denies chest pain, SOB, MAGUIRE, arm discoloration or swelling. Discharged on xarelto. [de-identified] : s/p robotic R first rib resection\par \par Now s/p RUE venogram and angioplasty of R subclavian and axillary veins on 10/26/22\par \par No complaints \par

## 2022-11-06 NOTE — PHYSICAL EXAM
[Normal Rate and Rhythm] : normal rate and rhythm [2+] : left 2+ [Ankle Swelling (On Exam)] : not present [Varicose Veins Of Lower Extremities] : not present [] : not present [Abdomen Tenderness] : ~T ~M No abdominal tenderness [No Rash or Lesion] : No rash or lesion [Alert] : alert [Oriented to Person] : oriented to person [Oriented to Place] : oriented to place [Oriented to Time] : oriented to time [Calm] : calm [de-identified] : NAD [de-identified] : unlabored breathing [FreeTextEntry1] : no arm edema\par Prolene suture in place [de-identified] : FROM of all 4 extremities\par

## 2022-11-10 ENCOUNTER — APPOINTMENT (OUTPATIENT)
Dept: THORACIC SURGERY | Facility: CLINIC | Age: 32
End: 2022-11-10

## 2022-11-10 VITALS
OXYGEN SATURATION: 99 % | TEMPERATURE: 98.2 F | RESPIRATION RATE: 16 BRPM | DIASTOLIC BLOOD PRESSURE: 89 MMHG | WEIGHT: 150 LBS | SYSTOLIC BLOOD PRESSURE: 125 MMHG | HEIGHT: 63 IN | HEART RATE: 81 BPM | BODY MASS INDEX: 26.58 KG/M2

## 2022-11-10 PROCEDURE — 99024 POSTOP FOLLOW-UP VISIT: CPT

## 2022-11-10 NOTE — PHYSICAL EXAM
[Respiration, Rhythm And Depth] : normal respiratory rhythm and effort [Auscultation Breath Sounds / Voice Sounds] : lungs were clear to auscultation bilaterally [Heart Rate And Rhythm] : heart rate was normal and rhythm regular [Heart Sounds] : normal S1 and S2 [Clean] : clean [Dry] : dry [Healing Well] : healing well [No Edema] : no edema

## 2022-11-10 NOTE — DISCUSSION/SUMMARY
[Doing Well] : is doing well [Fair Pain Control] : has fair pain control [No Sign of Infection] : is showing no signs of infection

## 2022-11-11 ENCOUNTER — APPOINTMENT (OUTPATIENT)
Dept: DERMATOLOGY | Facility: CLINIC | Age: 32
End: 2022-11-11

## 2022-11-11 PROCEDURE — 99214 OFFICE O/P EST MOD 30 MIN: CPT

## 2022-11-11 NOTE — CONSULT LETTER
[Dear  ___] : Dear  [unfilled], [Courtesy Letter:] : I had the pleasure of seeing your patient, [unfilled], in my office today. [Please see my note below.] : Please see my note below. [Consult Closing:] : Thank you very much for allowing me to participate in the care of this patient.  If you have any questions, please do not hesitate to contact me. [Sincerely,] : Sincerely, [FreeTextEntry2] : Pallavi Manvar-Singh, MD [FreeTextEntry3] : Jose J Peoples MD\par Director of Thoracic Surgery, [facility]\par  Cardiovascular & Thoracic Surgery\par [facility]\par [address] [place] [address]\[place] [address]\par Tel. [phone]\par Fax [phone]

## 2022-11-11 NOTE — REASON FOR VISIT
[de-identified] : Flexible bronchoscopy, right robot assisted first rib resection. [de-identified] : 09/07/22 [de-identified] : Presents today status post venogram.\par \par She has had a minor improvement in the right upper quadrant discomfort and it is still numb. There is also mild pain in the right anterior chest. Patient denies palpitations, shortness of breath, cough, fevers, chills, fatigue and unintentional weight loss or gain.

## 2022-11-11 NOTE — ASSESSMENT
[FreeTextEntry1] : I had the pleasure to evaluate NIKOS MACEDO following first rib resection for thoracic outlet syndrome on 9/7/22. \par \par Postoperative course has been notable for persistent discomfort, notably in the right upper quadrant of the abdomen and right anterior chest wall. She reports some improvement in the pain, so I feel we can try to reduce her dose of Gabapentin at this point to once daily.\par \par Plan:\par - Reduce Gabapentin to once daily\par - Return to care in one month for clinical check up\par \par I, NOEMI Doll, am scribing for and in the presence of Dr. Peoples the following sections HISTORY OF PRESENT ILLNESS, PAST MEDICAL/FAMILY/SOCIAL HISTORY; REVIEW OF SYSTEMS, VITAL SIGNS, PHYSICAL EXAM, ASSESSMENT, PLAN AND DISPOSITION.\par \par "I personally performed the services described in the documentation and reviewed the documentation recorded by the scribe in my presence which accurately and completely recorded my words and actions." \par \par

## 2022-11-17 ENCOUNTER — APPOINTMENT (OUTPATIENT)
Dept: THORACIC SURGERY | Facility: CLINIC | Age: 32
End: 2022-11-17

## 2022-12-02 ENCOUNTER — APPOINTMENT (OUTPATIENT)
Dept: VASCULAR SURGERY | Facility: CLINIC | Age: 32
End: 2022-12-02

## 2022-12-02 VITALS
TEMPERATURE: 98.4 F | WEIGHT: 163 LBS | OXYGEN SATURATION: 97 % | DIASTOLIC BLOOD PRESSURE: 84 MMHG | HEART RATE: 79 BPM | BODY MASS INDEX: 28.88 KG/M2 | RESPIRATION RATE: 14 BRPM | HEIGHT: 63 IN | SYSTOLIC BLOOD PRESSURE: 135 MMHG

## 2022-12-02 PROCEDURE — 99214 OFFICE O/P EST MOD 30 MIN: CPT

## 2022-12-02 NOTE — ASSESSMENT
[FreeTextEntry1] : 32 yo F with history of anxiety and PUD recently admitted for RUE subclavian vein thrombosis with right hand parasthesias and swelling s/p RUE mechanical thrombectomy (INARI) on 8/10/22, diagnosed with Paget Schroetter. \par s/p robotic R first rib resection\par Now s/p RUE venogram and angioplasty of R subclavian and axillary veins on 10/26/22\par On xarelto\par \par \par  [Arterial/Venous Disease] : arterial/venous disease [Medication Management] : medication management

## 2022-12-02 NOTE — HISTORY OF PRESENT ILLNESS
[FreeTextEntry1] : 30 yo F with history of anxiety and PUD recently admitted for RUE subclavian vein thrombosis with right hand parasthesias and swelling s/p RUE mechanical thrombectomy (INARI) on 8/10/22, diagnosed with Paget Schroetter. Patient doing well. Evaluated by Dr. Peoples (CTS) for robotic L first rib resection. \par Denies chest pain, SOB, MAGUIRE, arm discoloration or swelling. Discharged on xarelto. [de-identified] : s/p robotic R first rib resection\par \par Now s/p RUE venogram and angioplasty of R subclavian and axillary veins on 10/26/22\par \par No complaints \par \par Compliant with xarelto for past 3 months\par

## 2022-12-02 NOTE — PHYSICAL EXAM
[Normal Rate and Rhythm] : normal rate and rhythm [2+] : left 2+ [Ankle Swelling (On Exam)] : not present [Varicose Veins Of Lower Extremities] : not present [] : not present [Abdomen Tenderness] : ~T ~M No abdominal tenderness [No Rash or Lesion] : No rash or lesion [Alert] : alert [Oriented to Person] : oriented to person [Oriented to Place] : oriented to place [Oriented to Time] : oriented to time [Calm] : calm [de-identified] : NAD [de-identified] : unlabored breathing [FreeTextEntry1] : no arm edema [de-identified] : FROM of all 4 extremities\par

## 2022-12-08 ENCOUNTER — APPOINTMENT (OUTPATIENT)
Dept: THORACIC SURGERY | Facility: CLINIC | Age: 32
End: 2022-12-08

## 2022-12-08 VITALS
BODY MASS INDEX: 28.88 KG/M2 | HEIGHT: 63 IN | WEIGHT: 163 LBS | DIASTOLIC BLOOD PRESSURE: 89 MMHG | HEART RATE: 80 BPM | SYSTOLIC BLOOD PRESSURE: 135 MMHG | OXYGEN SATURATION: 99 % | RESPIRATION RATE: 16 BRPM

## 2022-12-08 PROCEDURE — 99024 POSTOP FOLLOW-UP VISIT: CPT

## 2022-12-08 RX ORDER — RIVAROXABAN 20 MG/1
20 TABLET, FILM COATED ORAL
Qty: 30 | Refills: 3 | Status: COMPLETED | COMMUNITY
Start: 2022-08-19 | End: 2022-12-08

## 2022-12-08 RX ORDER — RIVAROXABAN 15 MG-20MG
15 & 20 KIT ORAL
Qty: 51 | Refills: 0 | Status: COMPLETED | COMMUNITY
Start: 2022-08-10 | End: 2022-12-08

## 2022-12-09 NOTE — ASSESSMENT
[FreeTextEntry1] : Ms Oquendo reports to the office today accompanied by her  for follow up post operative care. \par \par She has since followed up her care with vascular surgery and it was recommended she maintain aspirin for 1 year per vascular. She has nerve tingling but no post operative pain, some pulling at the breast and is taking Gabapentin 300mg daily with mild improvement in symptoms. At this time she should continue with physical therapy and return in 3 months for follow up evaluation.\par \par \par PLAN:\par - Return to care in 3 months\par \par \par \par \par \par \par \par Gary SCHMIDT NP am scribing for and in the presence of Dr. Peoples the following sections HISTORY OF PRESENT ILLNESS, PAST MEDICAL/FAMILY/SOCIAL HISTORY; REVIEW OF SYSTEMS; VITAL SIGNS; PHYSICAL EXAM; DISPOSITION.\par \par "I personally performed the services described in the documentation, reviewed the documentation recorded by the scribe in my presence and accurately and completely records my words and actions."\par

## 2022-12-09 NOTE — REASON FOR VISIT
[de-identified] : Flexible bronchoscopy, right robot assisted first rib resection [de-identified] : 09/07/22  [de-identified] : Today the patient reports overall improvement in range of motion as well as discomfort. There remains a degree of numbness to the flank and breast area with occasional pulling sensation. She is taking Gabapentin 300mg once daily with some relief.

## 2022-12-09 NOTE — PHYSICAL EXAM
[Respiration, Rhythm And Depth] : normal respiratory rhythm and effort [Auscultation Breath Sounds / Voice Sounds] : lungs were clear to auscultation bilaterally [Heart Rate And Rhythm] : heart rate was normal and rhythm regular

## 2022-12-19 RX ORDER — GABAPENTIN 300 MG/1
300 CAPSULE ORAL TWICE DAILY
Qty: 30 | Refills: 3 | Status: ACTIVE | COMMUNITY
Start: 2022-09-15 | End: 1900-01-01

## 2023-02-27 ENCOUNTER — NON-APPOINTMENT (OUTPATIENT)
Age: 33
End: 2023-02-27

## 2023-02-28 ENCOUNTER — EMERGENCY (EMERGENCY)
Facility: HOSPITAL | Age: 33
LOS: 1 days | Discharge: HOME | End: 2023-02-28
Attending: STUDENT IN AN ORGANIZED HEALTH CARE EDUCATION/TRAINING PROGRAM
Payer: COMMERCIAL

## 2023-02-28 VITALS
DIASTOLIC BLOOD PRESSURE: 84 MMHG | OXYGEN SATURATION: 98 % | HEART RATE: 117 BPM | HEIGHT: 63 IN | RESPIRATION RATE: 20 BRPM | SYSTOLIC BLOOD PRESSURE: 145 MMHG | WEIGHT: 169.98 LBS | TEMPERATURE: 100 F

## 2023-02-28 DIAGNOSIS — S42.301A UNSPECIFIED FRACTURE OF SHAFT OF HUMERUS, RIGHT ARM, INITIAL ENCOUNTER FOR CLOSED FRACTURE: Chronic | ICD-10-CM

## 2023-02-28 DIAGNOSIS — Z86.718 PERSONAL HISTORY OF OTHER VENOUS THROMBOSIS AND EMBOLISM: Chronic | ICD-10-CM

## 2023-02-28 DIAGNOSIS — Z98.890 OTHER SPECIFIED POSTPROCEDURAL STATES: Chronic | ICD-10-CM

## 2023-02-28 LAB
ALBUMIN SERPL ELPH-MCNC: 4.3 G/DL — SIGNIFICANT CHANGE UP (ref 3.3–5.2)
ALP SERPL-CCNC: 55 U/L — SIGNIFICANT CHANGE UP (ref 40–120)
ALT FLD-CCNC: 24 U/L — SIGNIFICANT CHANGE UP
ANION GAP SERPL CALC-SCNC: 16 MMOL/L — SIGNIFICANT CHANGE UP (ref 5–17)
AST SERPL-CCNC: 25 U/L — SIGNIFICANT CHANGE UP
BASOPHILS # BLD AUTO: 0.03 K/UL — SIGNIFICANT CHANGE UP (ref 0–0.2)
BASOPHILS NFR BLD AUTO: 0.3 % — SIGNIFICANT CHANGE UP (ref 0–2)
BILIRUB SERPL-MCNC: 1.5 MG/DL — SIGNIFICANT CHANGE UP (ref 0.4–2)
BUN SERPL-MCNC: 13.4 MG/DL — SIGNIFICANT CHANGE UP (ref 8–20)
CALCIUM SERPL-MCNC: 8.7 MG/DL — SIGNIFICANT CHANGE UP (ref 8.4–10.5)
CHLORIDE SERPL-SCNC: 97 MMOL/L — SIGNIFICANT CHANGE UP (ref 96–108)
CO2 SERPL-SCNC: 22 MMOL/L — SIGNIFICANT CHANGE UP (ref 22–29)
CREAT SERPL-MCNC: 0.42 MG/DL — LOW (ref 0.5–1.3)
EGFR: 132 ML/MIN/1.73M2 — SIGNIFICANT CHANGE UP
EOSINOPHIL # BLD AUTO: 0.01 K/UL — SIGNIFICANT CHANGE UP (ref 0–0.5)
EOSINOPHIL NFR BLD AUTO: 0.1 % — SIGNIFICANT CHANGE UP (ref 0–6)
GLUCOSE SERPL-MCNC: 100 MG/DL — HIGH (ref 70–99)
HCG SERPL-ACNC: <4 MIU/ML — SIGNIFICANT CHANGE UP
HCT VFR BLD CALC: 45.9 % — HIGH (ref 34.5–45)
HGB BLD-MCNC: 15.4 G/DL — SIGNIFICANT CHANGE UP (ref 11.5–15.5)
HIV 1 & 2 AB SERPL IA.RAPID: SIGNIFICANT CHANGE UP
IMM GRANULOCYTES NFR BLD AUTO: 0.3 % — SIGNIFICANT CHANGE UP (ref 0–0.9)
LIDOCAIN IGE QN: 12 U/L — LOW (ref 22–51)
LYMPHOCYTES # BLD AUTO: 0.64 K/UL — LOW (ref 1–3.3)
LYMPHOCYTES # BLD AUTO: 5.3 % — LOW (ref 13–44)
MCHC RBC-ENTMCNC: 29.5 PG — SIGNIFICANT CHANGE UP (ref 27–34)
MCHC RBC-ENTMCNC: 33.6 GM/DL — SIGNIFICANT CHANGE UP (ref 32–36)
MCV RBC AUTO: 87.9 FL — SIGNIFICANT CHANGE UP (ref 80–100)
MONOCYTES # BLD AUTO: 0.57 K/UL — SIGNIFICANT CHANGE UP (ref 0–0.9)
MONOCYTES NFR BLD AUTO: 4.8 % — SIGNIFICANT CHANGE UP (ref 2–14)
NEUTROPHILS # BLD AUTO: 10.68 K/UL — HIGH (ref 1.8–7.4)
NEUTROPHILS NFR BLD AUTO: 89.2 % — HIGH (ref 43–77)
PLATELET # BLD AUTO: 356 K/UL — SIGNIFICANT CHANGE UP (ref 150–400)
POTASSIUM SERPL-MCNC: 3.9 MMOL/L — SIGNIFICANT CHANGE UP (ref 3.5–5.3)
POTASSIUM SERPL-SCNC: 3.9 MMOL/L — SIGNIFICANT CHANGE UP (ref 3.5–5.3)
PROT SERPL-MCNC: 7.4 G/DL — SIGNIFICANT CHANGE UP (ref 6.6–8.7)
RBC # BLD: 5.22 M/UL — HIGH (ref 3.8–5.2)
RBC # FLD: 12.4 % — SIGNIFICANT CHANGE UP (ref 10.3–14.5)
SODIUM SERPL-SCNC: 135 MMOL/L — SIGNIFICANT CHANGE UP (ref 135–145)
WBC # BLD: 11.97 K/UL — HIGH (ref 3.8–10.5)
WBC # FLD AUTO: 11.97 K/UL — HIGH (ref 3.8–10.5)

## 2023-02-28 PROCEDURE — 85025 COMPLETE CBC W/AUTO DIFF WBC: CPT

## 2023-02-28 PROCEDURE — 96375 TX/PRO/DX INJ NEW DRUG ADDON: CPT

## 2023-02-28 PROCEDURE — 74177 CT ABD & PELVIS W/CONTRAST: CPT | Mod: 26,MD

## 2023-02-28 PROCEDURE — 99284 EMERGENCY DEPT VISIT MOD MDM: CPT

## 2023-02-28 PROCEDURE — 84702 CHORIONIC GONADOTROPIN TEST: CPT

## 2023-02-28 PROCEDURE — 86703 HIV-1/HIV-2 1 RESULT ANTBDY: CPT

## 2023-02-28 PROCEDURE — 99284 EMERGENCY DEPT VISIT MOD MDM: CPT | Mod: 25

## 2023-02-28 PROCEDURE — 96374 THER/PROPH/DIAG INJ IV PUSH: CPT | Mod: XU

## 2023-02-28 PROCEDURE — 74177 CT ABD & PELVIS W/CONTRAST: CPT | Mod: MD

## 2023-02-28 PROCEDURE — 36415 COLL VENOUS BLD VENIPUNCTURE: CPT

## 2023-02-28 PROCEDURE — 80053 COMPREHEN METABOLIC PANEL: CPT

## 2023-02-28 PROCEDURE — 83690 ASSAY OF LIPASE: CPT

## 2023-02-28 PROCEDURE — 96361 HYDRATE IV INFUSION ADD-ON: CPT

## 2023-02-28 RX ORDER — KETOROLAC TROMETHAMINE 30 MG/ML
15 SYRINGE (ML) INJECTION ONCE
Refills: 0 | Status: DISCONTINUED | OUTPATIENT
Start: 2023-02-28 | End: 2023-02-28

## 2023-02-28 RX ORDER — ONDANSETRON 8 MG/1
4 TABLET, FILM COATED ORAL ONCE
Refills: 0 | Status: COMPLETED | OUTPATIENT
Start: 2023-02-28 | End: 2023-02-28

## 2023-02-28 RX ORDER — SODIUM CHLORIDE 9 MG/ML
1000 INJECTION, SOLUTION INTRAVENOUS ONCE
Refills: 0 | Status: COMPLETED | OUTPATIENT
Start: 2023-02-28 | End: 2023-02-28

## 2023-02-28 RX ADMIN — SODIUM CHLORIDE 1000 MILLILITER(S): 9 INJECTION, SOLUTION INTRAVENOUS at 18:31

## 2023-02-28 RX ADMIN — Medication 15 MILLIGRAM(S): at 18:32

## 2023-02-28 RX ADMIN — ONDANSETRON 4 MILLIGRAM(S): 8 TABLET, FILM COATED ORAL at 18:32

## 2023-02-28 NOTE — ED STATDOCS - NSICDXPASTSURGICALHX_GEN_ALL_CORE_FT
PAST SURGICAL HISTORY:  Arm fracture, right repair 1999    H/O thrombosis right subclavian    History of resection of rib right 1st rib

## 2023-02-28 NOTE — ED STATDOCS - PHYSICAL EXAMINATION
Vital Signs per nursing documentation  Gen: well appearing, no acute distress  HEENT: NCAT, MMM  Cardiac: tachycardic, regular rhythm , normal S1S2  Chest: clear to auscultation bilateral, no wheezes or crackles  Abdomen: soft, non distended, + ttp bilateral lower quadrants   Extremity: no gross deformity, good perfusion  Skin: no rash  Neuro: nonfocal neuro exam, gait steady

## 2023-02-28 NOTE — ED STATDOCS - CLINICAL SUMMARY MEDICAL DECISION MAKING FREE TEXT BOX
33 y/o female with PMHx  of thoracic outlet syndrome s/p angioplasty and 1st rib resection, on baby aspirin, presents to the ED c/o 2 days of worsening abdominal pain. Pt reports she returned from vacation 2 days ago and felt gas on pain, that worsened associated with fever, nausea, poor appetite, pain is lower on both lower quadrants, feels like cramps, LMP 2 weeks ago denies vaginal symptoms, dysuria, past abdominal surgeries. On exam pt is tachycardic, has bilateral lower quadrant tenderness     Pt with low grade fever in ED, tachycardic on exam will get labs, CT to r/o appendicitis

## 2023-02-28 NOTE — ED STATDOCS - PATIENT PORTAL LINK FT
You can access the FollowMyHealth Patient Portal offered by [facility] by registering at the following website: [email or web address]. By joining [employer]’s FollowMyHealth portal, you will also be able to view your health information using other applications (apps) compatible with our system.

## 2023-02-28 NOTE — ED ADULT TRIAGE NOTE - WEIGHT METHOD
Gen: Well appearing in NAD  Head: NC/AT  Neck: Trachea midline  Resp: No distress  Ext: No deformities  Neuro: A&O appears non focal  Skin: Warm and dry as visualized  Psych: Normal affect and mood stated

## 2023-02-28 NOTE — ED ADULT TRIAGE NOTE - CHIEF COMPLAINT QUOTE
Pt arrives to ED  abd pain  associated with low grade fever since yesterday . Concern for appendicitis

## 2023-02-28 NOTE — ED STATDOCS - NSICDXPASTMEDICALHX_GEN_ALL_CORE_FT
PAST MEDICAL HISTORY:  2019 novel coronavirus disease (COVID-19) july 2022    Gastric peptic ulcer hx of    Right subclavian vein thrombosis     Thoracic outlet syndrome

## 2023-02-28 NOTE — ED STATDOCS - OBJECTIVE STATEMENT
31 y/o female with PMHx  of thoracic outlet syndrome s/p angioplasty and 1st rib resection, on baby aspirin, presents to the ED c/o 2 days of worsening abdominal pain. Pt reports she returned from vacation 2 days ago and felt gas on pain, that worsened associated with fever, nausea, poor appetite, pain is lower on both lower quadrants, feels like cramps, LMP 2 weeks ago denies vaginal symptoms, dysuria, past abdominal surgeries.

## 2023-03-03 PROBLEM — I82.621 ACUTE DEEP VEIN THROMBOSIS (DVT) OF OTHER VEIN OF RIGHT UPPER EXTREMITY: Status: ACTIVE | Noted: 2022-08-16

## 2023-03-03 PROBLEM — G54.0 THORACIC OUTLET SYNDROME: Status: ACTIVE | Noted: 2022-08-16

## 2023-03-09 ENCOUNTER — APPOINTMENT (OUTPATIENT)
Dept: THORACIC SURGERY | Facility: CLINIC | Age: 33
End: 2023-03-09
Payer: COMMERCIAL

## 2023-03-09 VITALS
HEIGHT: 63 IN | RESPIRATION RATE: 16 BRPM | SYSTOLIC BLOOD PRESSURE: 141 MMHG | WEIGHT: 163 LBS | BODY MASS INDEX: 28.88 KG/M2 | DIASTOLIC BLOOD PRESSURE: 97 MMHG | OXYGEN SATURATION: 98 % | HEART RATE: 92 BPM

## 2023-03-09 DIAGNOSIS — I82.621 ACUTE EMBOLISM AND THROMBOSIS OF DEEP VEINS OF RIGHT UPPER EXTREMITY: ICD-10-CM

## 2023-03-09 DIAGNOSIS — G54.0 BRACHIAL PLEXUS DISORDERS: ICD-10-CM

## 2023-03-09 PROCEDURE — 99213 OFFICE O/P EST LOW 20 MIN: CPT

## 2023-03-09 NOTE — ASSESSMENT
[FreeTextEntry1] : Ms Oquendo presents to the office today to discuss her post operative status. She is overall well and has no residual arm discomfort. There is a small area of the right upper quadrant of her abdomen with some mild bulging likely related to neuropathy. She is no longer taking Gabapentin and would like to continue with Physical Therapy and possibly pain management. Will provide referral fro Physical Therapy. She will return to care as needed. \par \par PLAN:\par - Continue Physical Therapy\par - Return to care as needed\par \par \par \par \par \par I, Dr. Peoples, personally performed the evaluation and management (E/M) services for this new patient.  That E/M includes conducting the initial examination, assessing all conditions, and establishing the plan of care.  Today, my ACP, Gary Steve NP was here to observe my evaluation and management services for this patient to be followed going forward.\par \par \par

## 2023-03-09 NOTE — REVIEW OF SYSTEMS
[Feeling Poorly] : not feeling poorly [Feeling Tired] : not feeling tired [Chest Pain] : no chest pain [Palpitations] : no palpitations [Shortness Of Breath] : no shortness of breath [SOB on Exertion] : no shortness of breath during exertion [Abdominal Pain] : abdominal pain [Vomiting] : no vomiting [Constipation] : no constipation [Diarrhea] : no diarrhea [Heartburn] : no heartburn [Melena] : no melena [Negative] : Heme/Lymph

## 2023-03-09 NOTE — HISTORY OF PRESENT ILLNESS
[FreeTextEntry1] : Ms. MACEDO is a 31 year old female referred by Dr. Allie Sanchez who presents for consultation. Her past medical history includes post COVID infection and right subclavian DVT. \par \par \par She had presented to [facility] with right subclavian thrombus. The finding was noted at an outpatient urgent Center and then reconfirmed in the ED. The patient was admitted secondary to concerns for possible Thoracic outlet syndrome. A venogram was performed 8/10 which confirmed thoracic outlet syndrome. Thrombectomy was performed to relive the obstructed region. She was ultimately discharged to follow up with thoracic surgery for surgical planning to correct the thoracic outlet disorder.\par \par She is now s/p first rib resection on 09/07/22. Post operative she had pain management issues requiring Gabapentin daily.  She presents to the office for a clinical recheck.\par \par She reports 1 part of her abdomen right upper quadrant that has a sharp shooting pain when she coughs. She no longer requires gabapentin.

## 2023-04-17 NOTE — ED ADULT TRIAGE NOTE - INTERNATIONAL TRAVEL
No
right knee not assessed due to surgery/bilateral upper extremity ROM was WFL (within functional limits)/bilateral lower extremity ROM was WFL (within functional limits)

## 2023-08-21 NOTE — H&P PST ADULT - PSY GEN HX ROS MEA POS PC
8/21/2023         RE: Mary Lopez  [address] [place] [address]        Dear Colleague,    Thank you for referring your patient, Mary Lopez, to the [facility]. Please see a copy of my visit note below.    History of Present Illness - Mary Lopez is a 49 year old male here to see me for the first time for nose bleeds.    He tells me that he has had a lifelong history of nosebleeds, but they were only about monthly.  But recently it has increased considerably.  They can happen any time, without any trauma or blowing.  He will even get them just bending down or picking up heavy objects.    He does have very low platelets due to chronic liver failure.  No issues with prolonged bleeding following dental work or minor procedures.  There was no preceding changes in nasal health, nasal airway, change in vision, or new onset facial pain or headaches.      Past medical history -   Patient Active Problem List   Diagnosis     Essential hypertension     Tobacco abuse disorder     Alcohol use     IFG (impaired fasting glucose)     Mild hyperlipidemia     Elevated liver enzymes     Morbid obesity (H)     Alcoholic cirrhosis of liver without ascites (H)     Alcoholic hepatitis without ascites     Portal hypertensive gastropathy (H)     Splenomegaly     Secondary esophageal varices without bleeding (H)     Anemia due to unknown mechanism     Thrombocytopenia (H)     Persistent insomnia     Bilateral leg edema       /73   Pulse 103   SpO2 99%       General - The patient is well nourished and well developed, and appears to have good nutritional status.  Alert and oriented to person and place, answers questions and cooperates with examination appropriately.   Head and Face - Normocephalic and atraumatic, with no gross asymmetry noted of the contour of the facial features.  The facial nerve is intact, with strong symmetric movements.  Eyes - Extraocular movements intact,  and the pupils were reactive to light.  Sclera were not icteric or injected, conjunctiva were pink and moist.  Mouth - Examination of the oral cavity shows pink, healthy, moist mucosa.  No lesions or ulceration noted.  The dentition are in good repair.  The tongue is mobile and midline.    Nasal Cautery - Options were explained to the patient regarding conservative measures versus nasal cautery in the clinic today.  The patient wished to proceed with cautery.  I placed a small piece of cotton soaked in 4% liquid lidocaine in the anterior nasal cavity over the area of prominent vessels, one spot on the RIGHT, and two on the LEFT .  This was left in place for 10 minutes.  I then proceeded to remove the cotton, and applied silver nitrate to the vessels, starting distally, and working my way back to the vessels  point of entry onto the nasal mucosa.  After completion, I placed a small piece of Surgicel over the area that was cauterized.  The patient tolerated the procedure well.      A/P - Mary Lopez  (K70.30) Alcoholic cirrhosis of liver without ascites (H)  (D64.9) Anemia due to unknown mechanism  (R04.0) Epistaxis    The patient has been cauterized today for epistaxis.  I counseled them on keeping the head above the level of the heart at all times for the next week.  No picking or rubbing at the cauterized side of the nose, or blowing for 1 week.  The patient was counseled that in the event of another recurrence of bleeding, to call into my direct scheduling line so I can see them with 24 hours.      Again, thank you for allowing me to participate in the care of your patient.        Sincerely,        Phil Lynn MD   anxiety

## 2023-10-02 ENCOUNTER — ASOB RESULT (OUTPATIENT)
Age: 33
End: 2023-10-02

## 2023-10-02 ENCOUNTER — APPOINTMENT (OUTPATIENT)
Dept: MATERNAL FETAL MEDICINE | Facility: CLINIC | Age: 33
End: 2023-10-02
Payer: COMMERCIAL

## 2023-10-02 PROCEDURE — 99204 OFFICE O/P NEW MOD 45 MIN: CPT | Mod: 95

## 2023-12-05 ENCOUNTER — RX ONLY (RX ONLY)
Age: 33
End: 2023-12-05

## 2023-12-05 ENCOUNTER — OFFICE (OUTPATIENT)
Dept: URBAN - METROPOLITAN AREA CLINIC 12 | Facility: CLINIC | Age: 33
Setting detail: OPHTHALMOLOGY
End: 2023-12-05
Payer: COMMERCIAL

## 2023-12-05 DIAGNOSIS — H35.413: ICD-10-CM

## 2023-12-05 DIAGNOSIS — H16.222: ICD-10-CM

## 2023-12-05 DIAGNOSIS — H43.812: ICD-10-CM

## 2023-12-05 PROBLEM — H52.13 HIGH MYOPIA; BOTH EYES: Status: ACTIVE | Noted: 2023-12-05

## 2023-12-05 PROCEDURE — 92250 FUNDUS PHOTOGRAPHY W/I&R: CPT | Performed by: STUDENT IN AN ORGANIZED HEALTH CARE EDUCATION/TRAINING PROGRAM

## 2023-12-05 PROCEDURE — 92004 COMPRE OPH EXAM NEW PT 1/>: CPT | Performed by: STUDENT IN AN ORGANIZED HEALTH CARE EDUCATION/TRAINING PROGRAM

## 2023-12-05 ASSESSMENT — SPHEQUIV_DERIVED
OD_SPHEQUIV: -7.75
OD_SPHEQUIV: -7.75
OS_SPHEQUIV: -9.375
OS_SPHEQUIV: -9

## 2023-12-05 ASSESSMENT — REFRACTION_AUTOREFRACTION
OD_AXIS: 106
OD_CYLINDER: -0.50
OD_SPHERE: -7.50
OS_AXIS: 81
OS_SPHERE: -9.00
OS_CYLINDER: -0.75

## 2023-12-05 ASSESSMENT — REFRACTION_MANIFEST
OD_VA1: 20/20
OD_AXIS: 105
OD_SPHERE: -7.50
OS_AXIS: 085
OS_VA1: 20/20
OS_SPHERE: -8.75
OD_CYLINDER: -0.50
OU_VA: 20/20
OS_CYLINDER: -0.50

## 2023-12-05 ASSESSMENT — CONFRONTATIONAL VISUAL FIELD TEST (CVF)
OD_FINDINGS: FULL
OS_FINDINGS: FULL

## 2023-12-05 ASSESSMENT — TEAR BREAK UP TIME (TBUT): OS_TBUT: 3 SEC

## 2024-01-26 NOTE — H&P PST ADULT - MUSCULOSKELETAL
details… Pal Molina (spouse) normal/ROM intact/normal gait/strength 5/5 bilateral upper extremities/strength 5/5 bilateral lower extremities

## 2024-07-08 ENCOUNTER — EMERGENCY (EMERGENCY)
Facility: HOSPITAL | Age: 34
LOS: 1 days | Discharge: HOME | End: 2024-07-08
Attending: STUDENT IN AN ORGANIZED HEALTH CARE EDUCATION/TRAINING PROGRAM
Payer: COMMERCIAL

## 2024-07-08 VITALS
OXYGEN SATURATION: 97 % | HEART RATE: 87 BPM | TEMPERATURE: 98 F | SYSTOLIC BLOOD PRESSURE: 139 MMHG | RESPIRATION RATE: 18 BRPM | WEIGHT: 184.09 LBS | DIASTOLIC BLOOD PRESSURE: 90 MMHG

## 2024-07-08 VITALS
OXYGEN SATURATION: 98 % | SYSTOLIC BLOOD PRESSURE: 133 MMHG | RESPIRATION RATE: 18 BRPM | DIASTOLIC BLOOD PRESSURE: 84 MMHG | HEART RATE: 78 BPM

## 2024-07-08 DIAGNOSIS — S42.301A UNSPECIFIED FRACTURE OF SHAFT OF HUMERUS, RIGHT ARM, INITIAL ENCOUNTER FOR CLOSED FRACTURE: Chronic | ICD-10-CM

## 2024-07-08 DIAGNOSIS — Z86.718 PERSONAL HISTORY OF OTHER VENOUS THROMBOSIS AND EMBOLISM: Chronic | ICD-10-CM

## 2024-07-08 DIAGNOSIS — Z98.890 OTHER SPECIFIED POSTPROCEDURAL STATES: Chronic | ICD-10-CM

## 2024-07-08 LAB
ALBUMIN SERPL ELPH-MCNC: 4.6 G/DL — SIGNIFICANT CHANGE UP (ref 3.3–5.2)
ALP SERPL-CCNC: 60 U/L — SIGNIFICANT CHANGE UP (ref 40–120)
ALT FLD-CCNC: 19 U/L — SIGNIFICANT CHANGE UP
ANION GAP SERPL CALC-SCNC: 16 MMOL/L — SIGNIFICANT CHANGE UP (ref 5–17)
APPEARANCE UR: CLEAR — SIGNIFICANT CHANGE UP
AST SERPL-CCNC: 19 U/L — SIGNIFICANT CHANGE UP
BACTERIA # UR AUTO: ABNORMAL /HPF
BASOPHILS # BLD AUTO: 0.05 K/UL — SIGNIFICANT CHANGE UP (ref 0–0.2)
BASOPHILS NFR BLD AUTO: 0.6 % — SIGNIFICANT CHANGE UP (ref 0–2)
BILIRUB SERPL-MCNC: 0.9 MG/DL — SIGNIFICANT CHANGE UP (ref 0.4–2)
BILIRUB UR-MCNC: NEGATIVE — SIGNIFICANT CHANGE UP
BUN SERPL-MCNC: 10.4 MG/DL — SIGNIFICANT CHANGE UP (ref 8–20)
CALCIUM SERPL-MCNC: 9.5 MG/DL — SIGNIFICANT CHANGE UP (ref 8.4–10.5)
CAST: 0 /LPF — SIGNIFICANT CHANGE UP (ref 0–4)
CHLORIDE SERPL-SCNC: 99 MMOL/L — SIGNIFICANT CHANGE UP (ref 96–108)
CO2 SERPL-SCNC: 23 MMOL/L — SIGNIFICANT CHANGE UP (ref 22–29)
COLOR SPEC: YELLOW — SIGNIFICANT CHANGE UP
CREAT SERPL-MCNC: 0.55 MG/DL — SIGNIFICANT CHANGE UP (ref 0.5–1.3)
DIFF PNL FLD: ABNORMAL
EGFR: 123 ML/MIN/1.73M2 — SIGNIFICANT CHANGE UP
EOSINOPHIL # BLD AUTO: 0.02 K/UL — SIGNIFICANT CHANGE UP (ref 0–0.5)
EOSINOPHIL NFR BLD AUTO: 0.3 % — SIGNIFICANT CHANGE UP (ref 0–6)
GLUCOSE SERPL-MCNC: 94 MG/DL — SIGNIFICANT CHANGE UP (ref 70–99)
GLUCOSE UR QL: NEGATIVE MG/DL — SIGNIFICANT CHANGE UP
HCG SERPL-ACNC: <4 MIU/ML — SIGNIFICANT CHANGE UP
HCT VFR BLD CALC: 43.3 % — SIGNIFICANT CHANGE UP (ref 34.5–45)
HGB BLD-MCNC: 14.3 G/DL — SIGNIFICANT CHANGE UP (ref 11.5–15.5)
IMM GRANULOCYTES NFR BLD AUTO: 0.1 % — SIGNIFICANT CHANGE UP (ref 0–0.9)
KETONES UR-MCNC: NEGATIVE MG/DL — SIGNIFICANT CHANGE UP
LACTATE SERPL-SCNC: 1.1 MMOL/L — SIGNIFICANT CHANGE UP (ref 0.5–2)
LEUKOCYTE ESTERASE UR-ACNC: NEGATIVE — SIGNIFICANT CHANGE UP
LIDOCAIN IGE QN: 19 U/L — LOW (ref 22–51)
LYMPHOCYTES # BLD AUTO: 1.89 K/UL — SIGNIFICANT CHANGE UP (ref 1–3.3)
LYMPHOCYTES # BLD AUTO: 23.8 % — SIGNIFICANT CHANGE UP (ref 13–44)
MCHC RBC-ENTMCNC: 27.5 PG — SIGNIFICANT CHANGE UP (ref 27–34)
MCHC RBC-ENTMCNC: 33 GM/DL — SIGNIFICANT CHANGE UP (ref 32–36)
MCV RBC AUTO: 83.3 FL — SIGNIFICANT CHANGE UP (ref 80–100)
MONOCYTES # BLD AUTO: 0.45 K/UL — SIGNIFICANT CHANGE UP (ref 0–0.9)
MONOCYTES NFR BLD AUTO: 5.7 % — SIGNIFICANT CHANGE UP (ref 2–14)
NEUTROPHILS # BLD AUTO: 5.52 K/UL — SIGNIFICANT CHANGE UP (ref 1.8–7.4)
NEUTROPHILS NFR BLD AUTO: 69.5 % — SIGNIFICANT CHANGE UP (ref 43–77)
NITRITE UR-MCNC: NEGATIVE — SIGNIFICANT CHANGE UP
PH UR: 8 — SIGNIFICANT CHANGE UP (ref 5–8)
PLATELET # BLD AUTO: 376 K/UL — SIGNIFICANT CHANGE UP (ref 150–400)
POTASSIUM SERPL-MCNC: 3.8 MMOL/L — SIGNIFICANT CHANGE UP (ref 3.5–5.3)
POTASSIUM SERPL-SCNC: 3.8 MMOL/L — SIGNIFICANT CHANGE UP (ref 3.5–5.3)
PROT SERPL-MCNC: 7.5 G/DL — SIGNIFICANT CHANGE UP (ref 6.6–8.7)
PROT UR-MCNC: SIGNIFICANT CHANGE UP MG/DL
RBC # BLD: 5.2 M/UL — SIGNIFICANT CHANGE UP (ref 3.8–5.2)
RBC # FLD: 13.4 % — SIGNIFICANT CHANGE UP (ref 10.3–14.5)
RBC CASTS # UR COMP ASSIST: 1 /HPF — SIGNIFICANT CHANGE UP (ref 0–4)
SODIUM SERPL-SCNC: 137 MMOL/L — SIGNIFICANT CHANGE UP (ref 135–145)
SP GR SPEC: 1.01 — SIGNIFICANT CHANGE UP (ref 1–1.03)
SQUAMOUS # UR AUTO: 2 /HPF — SIGNIFICANT CHANGE UP (ref 0–5)
UROBILINOGEN FLD QL: 1 MG/DL — SIGNIFICANT CHANGE UP (ref 0.2–1)
WBC # BLD: 7.94 K/UL — SIGNIFICANT CHANGE UP (ref 3.8–10.5)
WBC # FLD AUTO: 7.94 K/UL — SIGNIFICANT CHANGE UP (ref 3.8–10.5)
WBC UR QL: 2 /HPF — SIGNIFICANT CHANGE UP (ref 0–5)

## 2024-07-08 PROCEDURE — 84702 CHORIONIC GONADOTROPIN TEST: CPT

## 2024-07-08 PROCEDURE — 81001 URINALYSIS AUTO W/SCOPE: CPT

## 2024-07-08 PROCEDURE — 36415 COLL VENOUS BLD VENIPUNCTURE: CPT

## 2024-07-08 PROCEDURE — 87086 URINE CULTURE/COLONY COUNT: CPT

## 2024-07-08 PROCEDURE — 74177 CT ABD & PELVIS W/CONTRAST: CPT | Mod: 26,MC

## 2024-07-08 PROCEDURE — 76700 US EXAM ABDOM COMPLETE: CPT | Mod: 26

## 2024-07-08 PROCEDURE — 99284 EMERGENCY DEPT VISIT MOD MDM: CPT | Mod: 25

## 2024-07-08 PROCEDURE — 74177 CT ABD & PELVIS W/CONTRAST: CPT | Mod: MC

## 2024-07-08 PROCEDURE — 99285 EMERGENCY DEPT VISIT HI MDM: CPT

## 2024-07-08 PROCEDURE — 83605 ASSAY OF LACTIC ACID: CPT

## 2024-07-08 PROCEDURE — 80053 COMPREHEN METABOLIC PANEL: CPT

## 2024-07-08 PROCEDURE — 76700 US EXAM ABDOM COMPLETE: CPT

## 2024-07-08 PROCEDURE — 83690 ASSAY OF LIPASE: CPT

## 2024-07-08 PROCEDURE — 85025 COMPLETE CBC W/AUTO DIFF WBC: CPT

## 2024-07-08 RX ORDER — SODIUM CHLORIDE 0.9 % (FLUSH) 0.9 %
1000 SYRINGE (ML) INJECTION ONCE
Refills: 0 | Status: COMPLETED | OUTPATIENT
Start: 2024-07-08 | End: 2024-07-08

## 2024-07-08 RX ADMIN — Medication 1000 MILLILITER(S): at 14:04

## 2024-07-09 LAB
CULTURE RESULTS: SIGNIFICANT CHANGE UP
SPECIMEN SOURCE: SIGNIFICANT CHANGE UP

## 2025-01-23 NOTE — PHYSICAL THERAPY INITIAL EVALUATION ADULT - GAIT DISTANCE, PT EVAL
LAB REVIEWED:    Has upcoming appointment  with Dr Delarosa on: 1-29     At that visit, I will review & discuss  lab results personally with patient. 10 feet

## 2025-02-21 ENCOUNTER — APPOINTMENT (OUTPATIENT)
Dept: DERMATOLOGY | Facility: CLINIC | Age: 35
End: 2025-02-21
Payer: COMMERCIAL

## 2025-02-21 PROCEDURE — 99214 OFFICE O/P EST MOD 30 MIN: CPT

## 2025-02-22 ENCOUNTER — NON-APPOINTMENT (OUTPATIENT)
Age: 35
End: 2025-02-22

## 2025-04-09 ENCOUNTER — NON-APPOINTMENT (OUTPATIENT)
Age: 35
End: 2025-04-09

## 2025-06-04 NOTE — ED ADULT TRIAGE NOTE - GLASGOW COMA SCALE: EYE OPENING, MLM
Extraction Method: 11 blade and q-tip Consent was obtained and risks were reviewed including but not limited to scarring, infection, bleeding, scabbing, incomplete removal, and allergy to anesthesia. Post-Care Instructions: I reviewed with the patient in detail post-care instructions. Patient is to keep the treatment areaas dry overnight, and then apply bacitracin twice daily until healed. Patient may apply hydrogen peroxide soaks to remove any crusting. Detail Level: Simple Prep Text (Optional): Prior to removal the treatment areas were prepped in the usual fashion. Render Post-Care Instructions In Note?: no Acne Type: Milial cysts (E4) spontaneous

## (undated) DEVICE — XI DRAPE ARM

## (undated) DEVICE — TAPE SILK 3"

## (undated) DEVICE — PACK ROBOTIC

## (undated) DEVICE — SUT VICRYL 2-0 27" UR-6

## (undated) DEVICE — XI STAPLER 30 CURVED TIP

## (undated) DEVICE — XI FORCEP FENESTRATED BIPOLAR 8MM

## (undated) DEVICE — XI STAPLER 45

## (undated) DEVICE — POSITIONER FOAM EGG CRATE ULNAR (2PCS)

## (undated) DEVICE — DRAPE GENERAL ENDOSCOPY

## (undated) DEVICE — ELCTR GROUNDING PAD ADULT COVIDIEN

## (undated) DEVICE — SUT SILK 0 30" CT-1

## (undated) DEVICE — XI GRASPER TIP UP FENESTRATED

## (undated) DEVICE — DRAIN PLEUR EVAC SAHARA

## (undated) DEVICE — NDL SPINAL 22G X 3.5"

## (undated) DEVICE — ENDOCATCH 10MM SPECIMEN POUCH

## (undated) DEVICE — SUT PROLENE 1 30" CTX

## (undated) DEVICE — COVER TIP INTUITIVE W INSERTION TOOL

## (undated) DEVICE — SUT MONOCRYL 4-0 27" PS-2 UNDYED

## (undated) DEVICE — BLADE SURGICAL #10 CARBON

## (undated) DEVICE — TUBING CONNECTING 6MM 20FT

## (undated) DEVICE — ELCTR EDGE BOVIE INSULATED BLADE TIP 6.5"

## (undated) DEVICE — SHEATH STAPLER ENDOWRIST SI

## (undated) DEVICE — SUT DERMABOND 0.7ML

## (undated) DEVICE — BLANKET WARMER LOWER ADULT

## (undated) DEVICE — PREP CHLORAPREP ORANGE 2PCT 26ML

## (undated) DEVICE — XI STAPLER SUREFORM 60

## (undated) DEVICE — GLV 8 PROTEXIS

## (undated) DEVICE — SHAFT EXTENDER ELECTRODE 11CM

## (undated) DEVICE — TRAY IRRIGATION SYR BULB 60CC

## (undated) DEVICE — SUT VICRYL 0 27" CT-2 UNDYED

## (undated) DEVICE — XI DISSECTOR CURVED BIPOLAR 8MM

## (undated) DEVICE — XI SEAL UNIV 5- 8 MM

## (undated) DEVICE — BLADE STD 2.5IN ELCTR MODIFIED

## (undated) DEVICE — XI 12MM AND STAPLER CANNULA SEAL

## (undated) DEVICE — STAPLER COVIDIEN ENDO GIA STANDARD HANDLE

## (undated) DEVICE — XI REDUCER CANN 12-8MM

## (undated) DEVICE — WRAP COMPRESSION CALF MED

## (undated) DEVICE — GLV 7.5 PROTEXIS

## (undated) DEVICE — XI DRAPE COLUMN

## (undated) DEVICE — ELCTR BOVIE PENCIL BLADE 10FT

## (undated) DEVICE — DRSG GAUZE PACKTNER ROLL

## (undated) DEVICE — SUCTION YANKAUER TAPERED BULBOUS NO VENT